# Patient Record
Sex: FEMALE | Race: BLACK OR AFRICAN AMERICAN | NOT HISPANIC OR LATINO | Employment: FULL TIME | URBAN - METROPOLITAN AREA
[De-identification: names, ages, dates, MRNs, and addresses within clinical notes are randomized per-mention and may not be internally consistent; named-entity substitution may affect disease eponyms.]

---

## 2023-02-23 PROBLEM — R87.612 PAP SMEAR ABNORMALITY OF CERVIX WITH LGSIL: Status: ACTIVE | Noted: 2023-02-23

## 2023-02-23 PROBLEM — R10.13 EPIGASTRIC DISCOMFORT: Status: ACTIVE | Noted: 2023-02-23

## 2023-02-23 PROBLEM — J30.1 HAY FEVER: Status: ACTIVE | Noted: 2023-02-23

## 2023-02-23 PROBLEM — M25.651 STIFFNESS OF RIGHT HIP JOINT: Status: ACTIVE | Noted: 2023-02-23

## 2023-02-23 PROBLEM — G47.00 INSOMNIA: Status: ACTIVE | Noted: 2023-02-23

## 2023-02-23 PROBLEM — R00.2 PALPITATIONS: Status: ACTIVE | Noted: 2023-02-23

## 2023-02-23 PROBLEM — R06.09 DOE (DYSPNEA ON EXERTION): Status: ACTIVE | Noted: 2023-02-23

## 2023-02-23 PROBLEM — S43.402A SPRAIN OF LEFT SHOULDER: Status: ACTIVE | Noted: 2023-02-23

## 2023-02-23 PROBLEM — R35.0 URINARY FREQUENCY: Status: ACTIVE | Noted: 2023-02-23

## 2023-02-23 PROBLEM — I82.401: Status: ACTIVE | Noted: 2023-02-23

## 2023-02-23 PROBLEM — B18.2 CHRONIC HEPATITIS C (MULTI): Status: ACTIVE | Noted: 2023-02-23

## 2023-02-23 PROBLEM — R42 DIZZINESS: Status: ACTIVE | Noted: 2023-02-23

## 2023-02-23 PROBLEM — I97.89: Status: ACTIVE | Noted: 2023-02-23

## 2023-02-23 PROBLEM — E78.5 HYPERLIPIDEMIA: Status: ACTIVE | Noted: 2023-02-23

## 2023-02-23 PROBLEM — I47.29 NSVT (NONSUSTAINED VENTRICULAR TACHYCARDIA) (MULTI): Status: ACTIVE | Noted: 2023-02-23

## 2023-02-23 PROBLEM — K21.9 GERD (GASTROESOPHAGEAL REFLUX DISEASE): Status: ACTIVE | Noted: 2023-02-23

## 2023-02-23 PROBLEM — M51.36 DISC DEGENERATION, LUMBAR: Status: ACTIVE | Noted: 2023-02-23

## 2023-02-23 PROBLEM — L90.5 FACIAL SCAR: Status: ACTIVE | Noted: 2023-02-23

## 2023-02-23 PROBLEM — R10.12 LEFT UPPER QUADRANT PAIN: Status: ACTIVE | Noted: 2023-02-23

## 2023-02-23 PROBLEM — M79.621 PAIN IN RIGHT AXILLA: Status: ACTIVE | Noted: 2023-02-23

## 2023-02-23 PROBLEM — M25.512 LEFT SHOULDER PAIN: Status: ACTIVE | Noted: 2023-02-23

## 2023-02-23 PROBLEM — R07.89 ATYPICAL CHEST PAIN: Status: ACTIVE | Noted: 2023-02-23

## 2023-02-23 PROBLEM — K76.0 FATTY LIVER: Status: ACTIVE | Noted: 2023-02-23

## 2023-02-23 PROBLEM — N28.1 CYST, KIDNEY, ACQUIRED: Status: ACTIVE | Noted: 2023-02-23

## 2023-02-23 PROBLEM — M54.12 CERVICAL RADICULOPATHY: Status: ACTIVE | Noted: 2023-02-23

## 2023-02-23 PROBLEM — I82.4Y9: Status: ACTIVE | Noted: 2023-02-23

## 2023-02-23 PROBLEM — I49.3 VENTRICULAR ECTOPICS: Status: ACTIVE | Noted: 2023-02-23

## 2023-02-23 PROBLEM — H92.02 ACUTE PAIN OF LEFT EAR: Status: ACTIVE | Noted: 2023-02-23

## 2023-02-23 PROBLEM — R22.30 MASS OF AXILLA: Status: ACTIVE | Noted: 2023-02-23

## 2023-02-23 PROBLEM — H69.91 DYSFUNCTION OF RIGHT EUSTACHIAN TUBE: Status: ACTIVE | Noted: 2023-02-23

## 2023-02-23 PROBLEM — M54.2 NECK PAIN: Status: ACTIVE | Noted: 2023-02-23

## 2023-02-23 PROBLEM — H00.021 HORDEOLUM INTERNUM OF RIGHT UPPER EYELID: Status: ACTIVE | Noted: 2023-02-23

## 2023-02-23 PROBLEM — N64.4 PAIN OF RIGHT BREAST: Status: ACTIVE | Noted: 2023-02-23

## 2023-02-23 PROBLEM — M17.10 PRIMARY LOCALIZED OSTEOARTHRITIS OF KNEE: Status: ACTIVE | Noted: 2023-02-23

## 2023-02-23 PROBLEM — J44.9 COPD (CHRONIC OBSTRUCTIVE PULMONARY DISEASE) (MULTI): Status: ACTIVE | Noted: 2023-02-23

## 2023-02-23 PROBLEM — M25.469 EFFUSION OF JOINT, LOWER LEG: Status: ACTIVE | Noted: 2023-02-23

## 2023-02-23 PROBLEM — R87.619 ABNORMAL PAP SMEAR OF CERVIX: Status: ACTIVE | Noted: 2023-02-23

## 2023-02-23 PROBLEM — E55.9 VITAMIN D DEFICIENCY: Status: ACTIVE | Noted: 2023-02-23

## 2023-02-23 PROBLEM — M51.369 DISC DEGENERATION, LUMBAR: Status: ACTIVE | Noted: 2023-02-23

## 2023-02-23 PROBLEM — H92.01 ACUTE PAIN OF RIGHT EAR: Status: ACTIVE | Noted: 2023-02-23

## 2023-02-23 PROBLEM — N94.9 VAGINAL DISCOMFORT: Status: ACTIVE | Noted: 2023-02-23

## 2023-02-23 PROBLEM — R11.0 NAUSEA IN ADULT: Status: ACTIVE | Noted: 2023-02-23

## 2023-02-23 PROBLEM — M25.519 SHOULDER PAIN: Status: ACTIVE | Noted: 2023-02-23

## 2023-02-23 PROBLEM — F17.210 CIGARETTE NICOTINE DEPENDENCE: Status: ACTIVE | Noted: 2023-02-23

## 2023-02-23 PROBLEM — M94.0 COSTOCHONDRITIS: Status: ACTIVE | Noted: 2023-02-23

## 2023-02-23 PROBLEM — R05.9 COUGH: Status: ACTIVE | Noted: 2023-02-23

## 2023-02-23 PROBLEM — R51.9 FREQUENT HEADACHES: Status: ACTIVE | Noted: 2023-02-23

## 2023-02-23 PROBLEM — M25.511 ACUTE PAIN OF RIGHT SHOULDER: Status: ACTIVE | Noted: 2023-02-23

## 2023-02-23 PROBLEM — M25.569 JOINT PAIN, KNEE: Status: ACTIVE | Noted: 2023-02-23

## 2023-02-23 PROBLEM — M25.551 RIGHT HIP PAIN: Status: ACTIVE | Noted: 2023-02-23

## 2023-02-23 PROBLEM — R87.610 ASCUS OF CERVIX WITH NEGATIVE HIGH RISK HPV: Status: ACTIVE | Noted: 2023-02-23

## 2023-02-23 PROBLEM — M54.50 LOWER BACK PAIN: Status: ACTIVE | Noted: 2023-02-23

## 2023-02-23 PROBLEM — M54.41 RIGHT-SIDED LOW BACK PAIN WITH SCIATICA: Status: ACTIVE | Noted: 2023-02-23

## 2023-02-23 PROBLEM — J01.90 ACUTE SINUSITIS: Status: ACTIVE | Noted: 2023-02-23

## 2023-02-23 PROBLEM — L73.2 HYDRADENITIS: Status: ACTIVE | Noted: 2023-02-23

## 2023-02-23 PROBLEM — J22 ACUTE RESPIRATORY INFECTION: Status: ACTIVE | Noted: 2023-02-23

## 2023-02-23 PROBLEM — M79.622 PAIN IN LEFT AXILLA: Status: ACTIVE | Noted: 2023-02-23

## 2023-02-23 PROBLEM — R10.31 RIGHT INGUINAL PAIN: Status: ACTIVE | Noted: 2023-02-23

## 2023-02-23 PROBLEM — M54.2 CERVICALGIA: Status: ACTIVE | Noted: 2023-02-23

## 2023-02-23 PROBLEM — M16.11 PRIMARY OSTEOARTHRITIS OF RIGHT HIP: Status: ACTIVE | Noted: 2023-02-23

## 2023-02-23 PROBLEM — B33.8 INFECTIOUS LYMPHOCYTOSIS: Status: ACTIVE | Noted: 2023-02-23

## 2023-02-23 PROBLEM — M25.561 RIGHT KNEE PAIN: Status: ACTIVE | Noted: 2023-02-23

## 2023-02-23 PROBLEM — M54.16 LUMBAR RADICULOPATHY: Status: ACTIVE | Noted: 2023-02-23

## 2023-02-23 PROBLEM — J34.89 SINUS PRESSURE: Status: ACTIVE | Noted: 2023-02-23

## 2023-02-23 PROBLEM — K59.00 CONSTIPATION: Status: ACTIVE | Noted: 2023-02-23

## 2023-02-23 PROBLEM — I47.10 PAROXYSMAL SVT (SUPRAVENTRICULAR TACHYCARDIA) (CMS-HCC): Status: ACTIVE | Noted: 2023-02-23

## 2023-02-23 PROBLEM — I49.8 FLUTTERING HEART: Status: ACTIVE | Noted: 2023-02-23

## 2023-02-23 RX ORDER — IBUPROFEN 800 MG/1
1 TABLET ORAL 3 TIMES DAILY PRN
COMMUNITY
Start: 2015-04-03 | End: 2023-04-05

## 2023-02-23 RX ORDER — ESZOPICLONE 2 MG/1
1 TABLET, FILM COATED ORAL NIGHTLY PRN
COMMUNITY
Start: 2020-10-13 | End: 2023-04-03 | Stop reason: SDUPTHER

## 2023-02-23 RX ORDER — OMEPRAZOLE 40 MG/1
1 CAPSULE, DELAYED RELEASE ORAL DAILY
COMMUNITY
Start: 2020-02-10 | End: 2023-11-16

## 2023-02-23 RX ORDER — FLUTICASONE PROPIONATE 50 MCG
1 SPRAY, SUSPENSION (ML) NASAL 2 TIMES DAILY
COMMUNITY

## 2023-02-23 RX ORDER — ALBUTEROL SULFATE 90 UG/1
1-2 AEROSOL, METERED RESPIRATORY (INHALATION)
COMMUNITY
Start: 2022-03-03 | End: 2023-04-05

## 2023-02-23 RX ORDER — ERGOCALCIFEROL 1.25 MG/1
1 CAPSULE ORAL
COMMUNITY
Start: 2020-02-05 | End: 2024-04-09

## 2023-02-23 RX ORDER — CEFDINIR 300 MG/1
300 CAPSULE ORAL EVERY 12 HOURS
COMMUNITY
End: 2023-03-17 | Stop reason: ALTCHOICE

## 2023-02-23 RX ORDER — FLECAINIDE ACETATE 50 MG/1
1 TABLET ORAL EVERY 12 HOURS
COMMUNITY
Start: 2021-12-16 | End: 2023-03-17 | Stop reason: SINTOL

## 2023-02-23 RX ORDER — ROSUVASTATIN CALCIUM 10 MG/1
1 TABLET, COATED ORAL DAILY
COMMUNITY
Start: 2022-03-04 | End: 2023-04-05

## 2023-03-17 ENCOUNTER — OFFICE VISIT (OUTPATIENT)
Dept: PRIMARY CARE | Facility: CLINIC | Age: 68
End: 2023-03-17
Payer: COMMERCIAL

## 2023-03-17 VITALS
HEIGHT: 65 IN | WEIGHT: 143 LBS | HEART RATE: 75 BPM | BODY MASS INDEX: 23.82 KG/M2 | SYSTOLIC BLOOD PRESSURE: 131 MMHG | DIASTOLIC BLOOD PRESSURE: 68 MMHG

## 2023-03-17 DIAGNOSIS — I97.89: ICD-10-CM

## 2023-03-17 DIAGNOSIS — Z01.419 ENCOUNTER FOR ROUTINE GYNECOLOGICAL EXAMINATION WITH PAPANICOLAOU SMEAR OF CERVIX: Primary | ICD-10-CM

## 2023-03-17 DIAGNOSIS — I82.401: ICD-10-CM

## 2023-03-17 DIAGNOSIS — J44.1 CHRONIC OBSTRUCTIVE PULMONARY DISEASE WITH ACUTE EXACERBATION (MULTI): ICD-10-CM

## 2023-03-17 DIAGNOSIS — B18.2 CHRONIC HEPATITIS C WITHOUT HEPATIC COMA (MULTI): ICD-10-CM

## 2023-03-17 DIAGNOSIS — I47.10 PAROXYSMAL SVT (SUPRAVENTRICULAR TACHYCARDIA) (CMS-HCC): ICD-10-CM

## 2023-03-17 DIAGNOSIS — R10.31 RIGHT INGUINAL PAIN: ICD-10-CM

## 2023-03-17 DIAGNOSIS — I10 BENIGN HYPERTENSION: ICD-10-CM

## 2023-03-17 DIAGNOSIS — Z00.00 ROUTINE PHYSICAL EXAMINATION: ICD-10-CM

## 2023-03-17 DIAGNOSIS — E55.9 VITAMIN D DEFICIENCY: ICD-10-CM

## 2023-03-17 PROCEDURE — 82652 VIT D 1 25-DIHYDROXY: CPT

## 2023-03-17 PROCEDURE — 3075F SYST BP GE 130 - 139MM HG: CPT | Performed by: NURSE PRACTITIONER

## 2023-03-17 PROCEDURE — 87591 N.GONORRHOEAE DNA AMP PROB: CPT

## 2023-03-17 PROCEDURE — 36415 COLL VENOUS BLD VENIPUNCTURE: CPT | Performed by: NURSE PRACTITIONER

## 2023-03-17 PROCEDURE — 4004F PT TOBACCO SCREEN RCVD TLK: CPT | Performed by: NURSE PRACTITIONER

## 2023-03-17 PROCEDURE — 80053 COMPREHEN METABOLIC PANEL: CPT

## 2023-03-17 PROCEDURE — 87491 CHLMYD TRACH DNA AMP PROBE: CPT

## 2023-03-17 PROCEDURE — 99397 PER PM REEVAL EST PAT 65+ YR: CPT | Performed by: NURSE PRACTITIONER

## 2023-03-17 PROCEDURE — 1159F MED LIST DOCD IN RCRD: CPT | Performed by: NURSE PRACTITIONER

## 2023-03-17 PROCEDURE — 85025 COMPLETE CBC W/AUTO DIFF WBC: CPT

## 2023-03-17 PROCEDURE — 80061 LIPID PANEL: CPT

## 2023-03-17 PROCEDURE — 88175 CYTOPATH C/V AUTO FLUID REDO: CPT

## 2023-03-17 PROCEDURE — 87624 HPV HI-RISK TYP POOLED RSLT: CPT

## 2023-03-17 PROCEDURE — 87205 SMEAR GRAM STAIN: CPT

## 2023-03-17 PROCEDURE — 1126F AMNT PAIN NOTED NONE PRSNT: CPT | Performed by: NURSE PRACTITIONER

## 2023-03-17 PROCEDURE — 3078F DIAST BP <80 MM HG: CPT | Performed by: NURSE PRACTITIONER

## 2023-03-17 PROCEDURE — 87661 TRICHOMONAS VAGINALIS AMPLIF: CPT

## 2023-03-17 PROCEDURE — 83036 HEMOGLOBIN GLYCOSYLATED A1C: CPT

## 2023-03-17 ASSESSMENT — ENCOUNTER SYMPTOMS
OCCASIONAL FEELINGS OF UNSTEADINESS: 0
DEPRESSION: 0
LOSS OF SENSATION IN FEET: 0

## 2023-03-17 NOTE — PROGRESS NOTES
Reason for Visit: Annual Physical Exam    HPI: fell about 3 weeks ago, slid down staircase, on buttocks. Reported bruising on buttocks. Continues to have right hip and groin pain. Was recently taken off of anticoagulation; per Dr. Castano. Had completed 6 months of anticoagulation for DVT.     She is not on Medicare, part B. Has commercial insurance.      Active Problem List  Patient Active Problem List   Diagnosis    Abnormal Pap smear of cervix    Acute deep vein thrombosis (DVT) of right lower extremity after procedure (CMS/HCC)    Acute pain of left ear    Acute pain of right ear    Acute pain of right shoulder    Acute respiratory infection    Acute sinusitis    ASCUS of cervix with negative high risk HPV    Atypical chest pain    Cervical radiculopathy    Cervicalgia    Chronic hepatitis C (CMS/HCC)    Cigarette nicotine dependence    Constipation    COPD (chronic obstructive pulmonary disease) (CMS/HCC)    Costochondritis    Cough    Cyst, kidney, acquired    Disc degeneration, lumbar    Dizziness    EVANS (dyspnea on exertion)    Dysfunction of right eustachian tube    Effusion of joint, lower leg    Epigastric discomfort    Facial scar    Fatty liver    Fluttering heart    Frequent headaches    GERD (gastroesophageal reflux disease)    Hay fever    Palpitations    Hordeolum internum of right upper eyelid    Hydradenitis    Hyperlipidemia    Infectious lymphocytosis    Insomnia    Joint pain, knee    Left shoulder pain    Left upper quadrant pain    Lower back pain    Lumbar radiculopathy    Mass of axilla    Nausea in adult    Neck pain    NSVT (nonsustained ventricular tachycardia)    Pain in left axilla    Pain in right axilla    Pain of right breast    Pap smear abnormality of cervix with LGSIL    Paroxysmal SVT (supraventricular tachycardia) (CMS/HCC)    Primary localized osteoarthritis of knee    Primary osteoarthritis of right hip    Right hip pain    Right inguinal pain    Right knee pain     Right-sided low back pain with sciatica    Shoulder pain    Sinus pressure    Sprain of left shoulder    Stiffness of right hip joint    Thigh DVT (deep venous thrombosis) (CMS/HCC)    Urinary frequency    Vaginal discomfort    Ventricular ectopics    Vitamin D deficiency       Comprehensive Medical/Surgical/Social/Family History  Past Medical History:   Diagnosis Date    Personal history of other infectious and parasitic diseases     History of hepatitis C     Past Surgical History:   Procedure Laterality Date    COLONOSCOPY  06/03/2013    Complete Colonoscopy    COLONOSCOPY  02/13/2014    Complete Colonoscopy    CT HEART CORONARY ANGIOGRAM  7/9/2021    CT HEART CORONARY ANGIOGRAM 7/9/2021 [facility] LEGAC[facility]    ESOPHAGOGASTRODUODENOSCOPY  02/27/2014    Diagnostic Esophagogastroduodenoscopy    LAPAROSCOPY DIAGNOSTIC / BIOPSY / ASPIRATION / LYSIS  02/13/2014    Exploratory Laparoscopy    OTHER SURGICAL HISTORY  06/05/2013    Chest Tube Insertion     Social History     Social History Narrative    Not on file         Allergies and Medications  Patient has no allergy information on record.  Current Outpatient Medications on File Prior to Visit   Medication Sig Dispense Refill    albuterol 90 mcg/actuation inhaler Inhale 1-2 puffs. EVERY 4 TO 6 HOURS AS NEEDED.      eszopiclone (Lunesta) 2 mg tablet Take 1 tablet (2 mg) by mouth as needed at bedtime for sleep.      fluticasone (Flonase) 50 mcg/actuation nasal spray Administer 1 spray into each nostril in the morning and 1 spray before bedtime. Shake gently. Before first use, prime pump. After use, clean tip and replace cap..      fluticasone-umeclidin-vilanter (TRELEGY-ELLIPTA) 100-62.5-25 mcg blister with device Inhale 1 puff once daily.      omeprazole (PriLOSEC) 40 mg DR capsule Take 1 capsule (40 mg) by mouth once daily.      rosuvastatin (Crestor) 10 mg tablet Take 1 tablet (10 mg) by mouth once daily.      ergocalciferol (Vitamin D-2) 1.25 MG (87614 UT) capsule  "Take 1 capsule (1,250 mcg) by mouth 1 (one) time per week.      flecainide (Tambocor) 50 mg tablet Take 1 tablet (50 mg) by mouth in the morning and 1 tablet (50 mg) in the evening.      ibuprofen 800 mg tablet Take 1 tablet (800 mg) by mouth 3 times a day as needed (take with food).      mv-mn/folic ac/calcium/vit K1 (WOMEN'S 50 PLUS MULTIVITAMIN ORAL) Take 1 tablet by mouth once daily.      [DISCONTINUED] cefdinir (Omnicef) 300 mg capsule Take 1 capsule (300 mg) by mouth in the morning and 1 capsule (300 mg) in the evening.      [DISCONTINUED] rivaroxaban (Xarelto) 20 mg tablet Take 1 tablet (20 mg) by mouth once daily.       No current facility-administered medications on file prior to visit.         ROS otherwise negative aside from what was mentioned above in HPI.    Vitals  /68   Pulse 75   Ht 1.651 m (5' 5\")   Wt 64.9 kg (143 lb)   BMI 23.80 kg/m²   Body mass index is 23.8 kg/m².  Physical Exam  Gen: Alert, NAD  HEENT:  PERRLA, EOMI, conjunctiva and sclera normal in appearance. External auditory canals/TMs normal; Oral cavity and posterior pharynx without lesions/exudate  Neck:  Supple with FROM; No masses/nodes palpable; Thyroid nontender and without nodules; No JG  Respiratory:  Lungs CTAB  Cardiovascular:  Heart RRR. No M/R/G. Peripheral pulses equal bilaterally  Abdomen:  Soft, nontender, BS present throughout; No R/G/R; No HSM or masses palpated  Extremities:  FROM all extremities; Muscle strength grossly normal with good tone  Neuro:  CN II-XII intact; Reflexes 2+/2+; Gross motor and sensory intact  Skin:  No suspicious lesions present  Breast: No masses, skin lesions or nipple discharges, no axillary lymphadenopathy    Assessment and Plan:  Problem List Items Addressed This Visit          Nervous    Right inguinal pain       Respiratory    COPD (chronic obstructive pulmonary disease) (CMS/HCC)       Circulatory    Acute deep vein thrombosis (DVT) of right lower extremity after procedure " (CMS/HCC)    Current Assessment & Plan     Off anticoagulation.          Relevant Orders    Vascular US upper extremity venous duplex right    Paroxysmal SVT (supraventricular tachycardia) (CMS/HCC)       Digestive    Chronic hepatitis C (CMS/HCC)       Endocrine/Metabolic    Vitamin D deficiency    Relevant Orders    Vitamin D 1,25 Dihydroxy     Other Visit Diagnoses       Encounter for routine gynecological examination with Papanicolaou smear of cervix    -  Primary    Relevant Orders    THINPREP PAP TEST    C. trachomatis / N. gonorrhoeae, DNA probe    TRICH VAGINALIS, AMPLIFIED    Gram Stain for Bacterial Vaginosis/Yeast    Benign hypertension        Relevant Orders    CBC and Auto Differential    Lipid Panel    Comprehensive Metabolic Panel    Routine physical examination        Relevant Orders    Hemoglobin A1C        Patient was identified as a fall risk. Risk prevention instructions provided. Consider PT if fall risk persists or gets worse.

## 2023-03-17 NOTE — PATIENT INSTRUCTIONS

## 2023-03-18 LAB
ALANINE AMINOTRANSFERASE (SGPT) (U/L) IN SER/PLAS: 7 U/L (ref 7–45)
ALBUMIN (G/DL) IN SER/PLAS: 4.1 G/DL (ref 3.4–5)
ALKALINE PHOSPHATASE (U/L) IN SER/PLAS: 83 U/L (ref 33–136)
ANION GAP IN SER/PLAS: 11 MMOL/L (ref 10–20)
ASPARTATE AMINOTRANSFERASE (SGOT) (U/L) IN SER/PLAS: 12 U/L (ref 9–39)
BASOPHILS (10*3/UL) IN BLOOD BY AUTOMATED COUNT: 0.01 X10E9/L (ref 0–0.1)
BASOPHILS/100 LEUKOCYTES IN BLOOD BY AUTOMATED COUNT: 0.2 % (ref 0–2)
BILIRUBIN TOTAL (MG/DL) IN SER/PLAS: 0.5 MG/DL (ref 0–1.2)
CALCIUM (MG/DL) IN SER/PLAS: 9.6 MG/DL (ref 8.6–10.6)
CARBON DIOXIDE, TOTAL (MMOL/L) IN SER/PLAS: 24 MMOL/L (ref 21–32)
CHLAMYDIA TRACH., AMPLIFIED: NEGATIVE
CHLORIDE (MMOL/L) IN SER/PLAS: 110 MMOL/L (ref 98–107)
CHOLESTEROL (MG/DL) IN SER/PLAS: 171 MG/DL (ref 0–199)
CHOLESTEROL IN HDL (MG/DL) IN SER/PLAS: 58.8 MG/DL
CHOLESTEROL/HDL RATIO: 2.9
CLUE CELLS: NORMAL
CREATININE (MG/DL) IN SER/PLAS: 0.69 MG/DL (ref 0.5–1.05)
EOSINOPHILS (10*3/UL) IN BLOOD BY AUTOMATED COUNT: 0.06 X10E9/L (ref 0–0.7)
EOSINOPHILS/100 LEUKOCYTES IN BLOOD BY AUTOMATED COUNT: 0.9 % (ref 0–6)
ERYTHROCYTE DISTRIBUTION WIDTH (RATIO) BY AUTOMATED COUNT: 14.7 % (ref 11.5–14.5)
ERYTHROCYTE MEAN CORPUSCULAR HEMOGLOBIN CONCENTRATION (G/DL) BY AUTOMATED: 31.7 G/DL (ref 32–36)
ERYTHROCYTE MEAN CORPUSCULAR VOLUME (FL) BY AUTOMATED COUNT: 96 FL (ref 80–100)
ERYTHROCYTES (10*6/UL) IN BLOOD BY AUTOMATED COUNT: 4.28 X10E12/L (ref 4–5.2)
ESTIMATED AVERAGE GLUCOSE FOR HBA1C: 123 MG/DL
GFR FEMALE: >90 ML/MIN/1.73M2
GLUCOSE (MG/DL) IN SER/PLAS: 86 MG/DL (ref 74–99)
HEMATOCRIT (%) IN BLOOD BY AUTOMATED COUNT: 41 % (ref 36–46)
HEMOGLOBIN (G/DL) IN BLOOD: 13 G/DL (ref 12–16)
HEMOGLOBIN A1C/HEMOGLOBIN TOTAL IN BLOOD: 5.9 %
IMMATURE GRANULOCYTES/100 LEUKOCYTES IN BLOOD BY AUTOMATED COUNT: 0.2 % (ref 0–0.9)
LDL: 94 MG/DL (ref 0–99)
LEUKOCYTES (10*3/UL) IN BLOOD BY AUTOMATED COUNT: 6.4 X10E9/L (ref 4.4–11.3)
LYMPHOCYTES (10*3/UL) IN BLOOD BY AUTOMATED COUNT: 1.69 X10E9/L (ref 1.2–4.8)
LYMPHOCYTES/100 LEUKOCYTES IN BLOOD BY AUTOMATED COUNT: 26.5 % (ref 13–44)
MONOCYTES (10*3/UL) IN BLOOD BY AUTOMATED COUNT: 0.42 X10E9/L (ref 0.1–1)
MONOCYTES/100 LEUKOCYTES IN BLOOD BY AUTOMATED COUNT: 6.6 % (ref 2–10)
N. GONORRHEA, AMPLIFIED: NEGATIVE
NEUTROPHILS (10*3/UL) IN BLOOD BY AUTOMATED COUNT: 4.18 X10E9/L (ref 1.2–7.7)
NEUTROPHILS/100 LEUKOCYTES IN BLOOD BY AUTOMATED COUNT: 65.6 % (ref 40–80)
NRBC (PER 100 WBCS) BY AUTOMATED COUNT: 0 /100 WBC (ref 0–0)
NUGENT SCORE: 2
PLATELETS (10*3/UL) IN BLOOD AUTOMATED COUNT: 238 X10E9/L (ref 150–450)
POTASSIUM (MMOL/L) IN SER/PLAS: 4.1 MMOL/L (ref 3.5–5.3)
PROTEIN TOTAL: 6.4 G/DL (ref 6.4–8.2)
SODIUM (MMOL/L) IN SER/PLAS: 141 MMOL/L (ref 136–145)
TRICHOMONAS VAGINALIS: NEGATIVE
TRIGLYCERIDE (MG/DL) IN SER/PLAS: 93 MG/DL (ref 0–149)
UREA NITROGEN (MG/DL) IN SER/PLAS: 13 MG/DL (ref 6–23)
VLDL: 19 MG/DL (ref 0–40)
YEAST: NORMAL

## 2023-03-20 ENCOUNTER — TELEPHONE (OUTPATIENT)
Dept: PRIMARY CARE | Facility: CLINIC | Age: 68
End: 2023-03-20
Payer: COMMERCIAL

## 2023-03-20 LAB — VITAMIN D 1,25-DIHYDROXY: 45.4 PG/ML (ref 19.9–79.3)

## 2023-03-23 ENCOUNTER — TELEPHONE (OUTPATIENT)
Dept: PRIMARY CARE | Facility: CLINIC | Age: 68
End: 2023-03-23
Payer: COMMERCIAL

## 2023-03-23 LAB
COMPLETE PATHOLOGY REPORT: NORMAL
CONVERTED CLINICAL DIAGNOSIS-HISTORY: NORMAL
CONVERTED DIAGNOSIS COMMENT: NORMAL
CONVERTED FINAL DIAGNOSIS: NORMAL
CONVERTED FINAL REPORT PDF LINK TO COPY AND PASTE: NORMAL

## 2023-03-23 NOTE — TELEPHONE ENCOUNTER
----- Message from KEYANNA Roman sent at 3/23/2023  1:26 PM EDT -----  Venous doppler negative for DVT in bilateral lower extremities.  ----- Message -----  From: Deepa Syngo - Cardiology Results In  Sent: 3/23/2023   8:19 AM EDT  To: KEYANNA Roman

## 2023-03-24 ENCOUNTER — TELEPHONE (OUTPATIENT)
Dept: PRIMARY CARE | Facility: CLINIC | Age: 68
End: 2023-03-24
Payer: COMMERCIAL

## 2023-04-03 DIAGNOSIS — G47.00 INSOMNIA, UNSPECIFIED TYPE: ICD-10-CM

## 2023-04-03 RX ORDER — ESZOPICLONE 2 MG/1
2 TABLET, FILM COATED ORAL NIGHTLY PRN
Qty: 90 TABLET | Refills: 0 | Status: SHIPPED | OUTPATIENT
Start: 2023-04-03 | End: 2023-06-13 | Stop reason: SDUPTHER

## 2023-04-04 DIAGNOSIS — M54.12 CERVICAL RADICULOPATHY: ICD-10-CM

## 2023-04-04 DIAGNOSIS — Z00.00 ENCOUNTER FOR GENERAL ADULT MEDICAL EXAMINATION WITHOUT ABNORMAL FINDINGS: ICD-10-CM

## 2023-04-04 DIAGNOSIS — K76.0 FATTY (CHANGE OF) LIVER, NOT ELSEWHERE CLASSIFIED: ICD-10-CM

## 2023-04-05 RX ORDER — ALBUTEROL SULFATE 90 UG/1
2 AEROSOL, METERED RESPIRATORY (INHALATION)
Qty: 28 G | Refills: 1 | Status: SHIPPED | OUTPATIENT
Start: 2023-04-05 | End: 2023-06-12

## 2023-04-05 RX ORDER — ROSUVASTATIN CALCIUM 10 MG/1
10 TABLET, COATED ORAL DAILY
Qty: 90 TABLET | Refills: 1 | Status: SHIPPED | OUTPATIENT
Start: 2023-04-05 | End: 2023-09-18 | Stop reason: SDUPTHER

## 2023-04-05 RX ORDER — IBUPROFEN 800 MG/1
800 TABLET ORAL 3 TIMES DAILY
Qty: 90 TABLET | Refills: 1 | Status: SHIPPED | OUTPATIENT
Start: 2023-04-05 | End: 2023-07-10

## 2023-04-26 DIAGNOSIS — T75.3XXD MOTION SICKNESS, SUBSEQUENT ENCOUNTER: Primary | ICD-10-CM

## 2023-04-26 RX ORDER — SCOLOPAMINE TRANSDERMAL SYSTEM 1 MG/1
1 PATCH, EXTENDED RELEASE TRANSDERMAL
Qty: 5 PATCH | Refills: 0 | Status: SHIPPED | OUTPATIENT
Start: 2023-04-26 | End: 2023-05-01

## 2023-06-11 DIAGNOSIS — Z00.00 ENCOUNTER FOR GENERAL ADULT MEDICAL EXAMINATION WITHOUT ABNORMAL FINDINGS: ICD-10-CM

## 2023-06-12 RX ORDER — ALBUTEROL SULFATE 90 UG/1
2 AEROSOL, METERED RESPIRATORY (INHALATION)
Qty: 54 G | Refills: 1 | Status: SHIPPED | OUTPATIENT
Start: 2023-06-12 | End: 2023-09-24

## 2023-06-13 DIAGNOSIS — G47.00 INSOMNIA, UNSPECIFIED TYPE: ICD-10-CM

## 2023-06-13 RX ORDER — ESZOPICLONE 2 MG/1
2 TABLET, FILM COATED ORAL NIGHTLY PRN
Qty: 90 TABLET | Refills: 0 | Status: SHIPPED | OUTPATIENT
Start: 2023-06-13 | End: 2023-09-18 | Stop reason: SDUPTHER

## 2023-07-10 DIAGNOSIS — M54.12 CERVICAL RADICULOPATHY: ICD-10-CM

## 2023-07-10 RX ORDER — IBUPROFEN 800 MG/1
800 TABLET ORAL 3 TIMES DAILY
Qty: 90 TABLET | Refills: 1 | Status: SHIPPED | OUTPATIENT
Start: 2023-07-10 | End: 2023-09-05

## 2023-09-04 DIAGNOSIS — M54.12 CERVICAL RADICULOPATHY: ICD-10-CM

## 2023-09-05 RX ORDER — IBUPROFEN 800 MG/1
800 TABLET ORAL 3 TIMES DAILY
Qty: 90 TABLET | Refills: 1 | Status: SHIPPED | OUTPATIENT
Start: 2023-09-05 | End: 2023-12-12

## 2023-09-18 ENCOUNTER — OFFICE VISIT (OUTPATIENT)
Dept: PRIMARY CARE | Facility: CLINIC | Age: 68
End: 2023-09-18
Payer: COMMERCIAL

## 2023-09-18 VITALS
BODY MASS INDEX: 23.66 KG/M2 | HEIGHT: 65 IN | HEART RATE: 80 BPM | WEIGHT: 142 LBS | DIASTOLIC BLOOD PRESSURE: 70 MMHG | SYSTOLIC BLOOD PRESSURE: 115 MMHG

## 2023-09-18 DIAGNOSIS — K76.0 FATTY (CHANGE OF) LIVER, NOT ELSEWHERE CLASSIFIED: ICD-10-CM

## 2023-09-18 DIAGNOSIS — E78.00 PURE HYPERCHOLESTEROLEMIA: Primary | ICD-10-CM

## 2023-09-18 DIAGNOSIS — H66.91 RIGHT OTITIS MEDIA, UNSPECIFIED OTITIS MEDIA TYPE: ICD-10-CM

## 2023-09-18 DIAGNOSIS — R73.03 PREDIABETES: ICD-10-CM

## 2023-09-18 DIAGNOSIS — G47.00 INSOMNIA, UNSPECIFIED TYPE: ICD-10-CM

## 2023-09-18 DIAGNOSIS — Z12.31 BREAST CANCER SCREENING BY MAMMOGRAM: ICD-10-CM

## 2023-09-18 LAB
ESTIMATED AVERAGE GLUCOSE FOR HBA1C: 114 MG/DL
HEMOGLOBIN A1C/HEMOGLOBIN TOTAL IN BLOOD: 5.6 %

## 2023-09-18 PROCEDURE — 4004F PT TOBACCO SCREEN RCVD TLK: CPT | Performed by: NURSE PRACTITIONER

## 2023-09-18 PROCEDURE — 99214 OFFICE O/P EST MOD 30 MIN: CPT | Performed by: NURSE PRACTITIONER

## 2023-09-18 PROCEDURE — 80358 DRUG SCREENING METHADONE: CPT

## 2023-09-18 PROCEDURE — 80354 DRUG SCREENING FENTANYL: CPT

## 2023-09-18 PROCEDURE — 80368 SEDATIVE HYPNOTICS: CPT

## 2023-09-18 PROCEDURE — 80061 LIPID PANEL: CPT

## 2023-09-18 PROCEDURE — 1160F RVW MEDS BY RX/DR IN RCRD: CPT | Performed by: NURSE PRACTITIONER

## 2023-09-18 PROCEDURE — 82550 ASSAY OF CK (CPK): CPT

## 2023-09-18 PROCEDURE — 83036 HEMOGLOBIN GLYCOSYLATED A1C: CPT

## 2023-09-18 PROCEDURE — 80307 DRUG TEST PRSMV CHEM ANLYZR: CPT

## 2023-09-18 PROCEDURE — 80346 BENZODIAZEPINES1-12: CPT

## 2023-09-18 PROCEDURE — 1126F AMNT PAIN NOTED NONE PRSNT: CPT | Performed by: NURSE PRACTITIONER

## 2023-09-18 PROCEDURE — 80361 OPIATES 1 OR MORE: CPT

## 2023-09-18 PROCEDURE — 80053 COMPREHEN METABOLIC PANEL: CPT

## 2023-09-18 PROCEDURE — 80365 DRUG SCREENING OXYCODONE: CPT

## 2023-09-18 PROCEDURE — 80373 DRUG SCREENING TRAMADOL: CPT

## 2023-09-18 PROCEDURE — 1159F MED LIST DOCD IN RCRD: CPT | Performed by: NURSE PRACTITIONER

## 2023-09-18 RX ORDER — ROSUVASTATIN CALCIUM 10 MG/1
10 TABLET, COATED ORAL DAILY
Qty: 90 TABLET | Refills: 1 | Status: SHIPPED | OUTPATIENT
Start: 2023-09-18 | End: 2024-04-16 | Stop reason: SDUPTHER

## 2023-09-18 RX ORDER — ESZOPICLONE 2 MG/1
2 TABLET, FILM COATED ORAL NIGHTLY PRN
Qty: 90 TABLET | Refills: 0 | Status: SHIPPED | OUTPATIENT
Start: 2023-09-18 | End: 2024-04-16 | Stop reason: SDUPTHER

## 2023-09-18 RX ORDER — CEFDINIR 300 MG/1
300 CAPSULE ORAL 2 TIMES DAILY
Qty: 14 CAPSULE | Refills: 0 | Status: SHIPPED | OUTPATIENT
Start: 2023-09-18 | End: 2023-09-25

## 2023-09-18 ASSESSMENT — ENCOUNTER SYMPTOMS
DEPRESSION: 0
LOSS OF SENSATION IN FEET: 0
OCCASIONAL FEELINGS OF UNSTEADINESS: 0

## 2023-09-18 NOTE — PROGRESS NOTES
"Subjective   Patient ID: Viola Berman is a 67 y.o. female who presents for Follow-up (6 month follow up visit ).    Viola is a 67 year old female who presents to the office today for a follow-up on medications. Stated she has been having right ear pain. Denied hearing loss. Has had some nasal congestion.     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    Thought she had Covid last week; 2 home tests negative and PCR negative.     Having right ear pain. Has been taking Tylenol Arthritis.     Having muscle cramping. Reported she has been taking Rosuvastatin daily.     Having PRP on Nov 14 for right hip pain.        Review of Systems   All other systems reviewed and are negative.      Objective   /70   Pulse 80   Ht 1.651 m (5' 5\")   Wt 64.4 kg (142 lb)   BMI 23.63 kg/m²     Physical Exam  Constitutional:       Appearance: Normal appearance.   HENT:      Head: Normocephalic and atraumatic.      Left Ear: Tympanic membrane normal.      Ears:      Comments: Right TM with hazy cone of light. Mild erythema in right ear canal.     Nose: Nose normal.      Mouth/Throat:      Mouth: Mucous membranes are moist.      Pharynx: Oropharynx is clear.   Eyes:      Extraocular Movements: Extraocular movements intact.      Conjunctiva/sclera: Conjunctivae normal.      Pupils: Pupils are equal, round, and reactive to light.   Cardiovascular:      Rate and Rhythm: Normal rate and regular rhythm.      Pulses: Normal pulses.      Heart sounds: Normal heart sounds.   Pulmonary:      Effort: Pulmonary effort is normal.      Breath sounds: Normal breath sounds.   Abdominal:      General: Abdomen is flat. Bowel sounds are normal.      Palpations: Abdomen is soft.   Musculoskeletal:         General: Normal range of motion.      Cervical back: Normal range of motion and neck supple.   Skin:     General: Skin is warm and dry.      Capillary Refill: Capillary refill takes less " than 2 seconds.   Neurological:      General: No focal deficit present.      Mental Status: She is alert and oriented to person, place, and time. Mental status is at baseline.   Psychiatric:         Mood and Affect: Mood normal.         Behavior: Behavior normal.         Thought Content: Thought content normal.         Judgment: Judgment normal.         Assessment/Plan   Problem List Items Addressed This Visit       Hyperlipidemia - Primary    Relevant Orders    Creatine Kinase    Comprehensive Metabolic Panel    Lipid Panel    Insomnia    Relevant Orders    Opiate/Opioid/Benzo Extended Prescription Compliance     Other Visit Diagnoses       Fatty (change of) liver, not elsewhere classified        Relevant Medications    rosuvastatin (Crestor) 10 mg tablet    Right otitis media, unspecified otitis media type        Relevant Medications    cefdinir (Omnicef) 300 mg capsule    Prediabetes        Relevant Orders    Hemoglobin A1C (Completed)    Breast cancer screening by mammogram        Relevant Orders    BI mammo bilateral screening tomosynthesis          1) Right otitis media/sinusitis: you were prescribed Cefdinir. May continue Tylenol for ear pain. Follow-up if no improvement or if symptoms worsen.    2) Insomnia: urine drug screen collected. Instructed to avoid caffeine at bedtime along with computer work or watching TV in bed. Avoid large meals before bed. Keep sleep schedule. Warm shower or massage before bedtime may help with sleep along with reading, soft music, breathing exercises or yoga. Recommend you get out of bed if not sleeping. Eszopiclone refilled.     3) Prediabetes: A1c ordered. Counseled on watching daily carbohydrates (portion control) such as breads, pasta, rice, starchy vegetables and sweets.

## 2023-09-19 DIAGNOSIS — G47.00 INSOMNIA, UNSPECIFIED TYPE: ICD-10-CM

## 2023-09-19 LAB
ALANINE AMINOTRANSFERASE (SGPT) (U/L) IN SER/PLAS: 10 U/L (ref 7–45)
ALBUMIN (G/DL) IN SER/PLAS: 4.3 G/DL (ref 3.4–5)
ALKALINE PHOSPHATASE (U/L) IN SER/PLAS: 83 U/L (ref 33–136)
ANION GAP IN SER/PLAS: 15 MMOL/L (ref 10–20)
ASPARTATE AMINOTRANSFERASE (SGOT) (U/L) IN SER/PLAS: 13 U/L (ref 9–39)
BILIRUBIN TOTAL (MG/DL) IN SER/PLAS: 0.3 MG/DL (ref 0–1.2)
CALCIUM (MG/DL) IN SER/PLAS: 9.4 MG/DL (ref 8.6–10.6)
CARBON DIOXIDE, TOTAL (MMOL/L) IN SER/PLAS: 19 MMOL/L (ref 21–32)
CHLORIDE (MMOL/L) IN SER/PLAS: 111 MMOL/L (ref 98–107)
CHOLESTEROL (MG/DL) IN SER/PLAS: 184 MG/DL (ref 0–199)
CHOLESTEROL IN HDL (MG/DL) IN SER/PLAS: 62.8 MG/DL
CHOLESTEROL/HDL RATIO: 2.9
CREATINE KINASE (U/L) IN SER/PLAS: 65 U/L (ref 0–215)
CREATININE (MG/DL) IN SER/PLAS: 0.86 MG/DL (ref 0.5–1.05)
GFR FEMALE: 74 ML/MIN/1.73M2
GLUCOSE (MG/DL) IN SER/PLAS: 96 MG/DL (ref 74–99)
LDL: 100 MG/DL (ref 0–99)
POTASSIUM (MMOL/L) IN SER/PLAS: 4.3 MMOL/L (ref 3.5–5.3)
PROTEIN TOTAL: 6.9 G/DL (ref 6.4–8.2)
SODIUM (MMOL/L) IN SER/PLAS: 141 MMOL/L (ref 136–145)
TRIGLYCERIDE (MG/DL) IN SER/PLAS: 107 MG/DL (ref 0–149)
UREA NITROGEN (MG/DL) IN SER/PLAS: 18 MG/DL (ref 6–23)
VLDL: 21 MG/DL (ref 0–40)

## 2023-09-19 RX ORDER — ESZOPICLONE 2 MG/1
2 TABLET, FILM COATED ORAL NIGHTLY PRN
Qty: 90 TABLET | Refills: 0 | OUTPATIENT
Start: 2023-09-19

## 2023-09-22 LAB
6-ACETYLMORPHINE: <25 NG/ML
7-AMINOCLONAZEPAM: <25 NG/ML
ALPHA-HYDROXYALPRAZOLAM: <25 NG/ML
ALPHA-HYDROXYMIDAZOLAM: <25 NG/ML
ALPRAZOLAM: <25 NG/ML
AMPHETAMINE (PRESENCE) IN URINE BY SCREEN METHOD: NORMAL
BARBITURATES PRESENCE IN URINE BY SCREEN METHOD: NORMAL
CANNABINOIDS IN URINE BY SCREEN METHOD: NORMAL
CHLORDIAZEPOXIDE: <25 NG/ML
CLONAZEPAM: <25 NG/ML
COCAINE (PRESENCE) IN URINE BY SCREEN METHOD: NORMAL
CODEINE: <50 NG/ML
CREATINE, URINE FOR DRUG: 167.6 MG/DL
DIAZEPAM: <25 NG/ML
DRUG SCREEN COMMENT URINE: NORMAL
EDDP: <25 NG/ML
FENTANYL CONFIRMATION, URINE: <2.5 NG/ML
HYDROCODONE: <25 NG/ML
HYDROMORPHONE: <25 NG/ML
LORAZEPAM: <25 NG/ML
METHADONE CONFIRMATION,URINE: <25 NG/ML
MIDAZOLAM: <25 NG/ML
MORPHINE URINE: <50 NG/ML
NORDIAZEPAM: <25 NG/ML
NORFENTANYL: <2.5 NG/ML
NORHYDROCODONE: <25 NG/ML
NOROXYCODONE: <25 NG/ML
O-DESMETHYLTRAMADOL: <50 NG/ML
OXAZEPAM: <25 NG/ML
OXYCODONE: <25 NG/ML
OXYMORPHONE: <25 NG/ML
PHENCYCLIDINE (PRESENCE) IN URINE BY SCREEN METHOD: NORMAL
TEMAZEPAM: <25 NG/ML
TRAMADOL: <50 NG/ML
ZOLPIDEM METABOLITE (ZCA): <25 NG/ML
ZOLPIDEM: <25 NG/ML

## 2023-09-23 DIAGNOSIS — Z00.00 ENCOUNTER FOR GENERAL ADULT MEDICAL EXAMINATION WITHOUT ABNORMAL FINDINGS: ICD-10-CM

## 2023-09-24 RX ORDER — ALBUTEROL SULFATE 90 UG/1
2 AEROSOL, METERED RESPIRATORY (INHALATION) EVERY 4 HOURS PRN
Qty: 18 G | Refills: 3 | Status: SHIPPED | OUTPATIENT
Start: 2023-09-24

## 2023-11-07 ENCOUNTER — OFFICE VISIT (OUTPATIENT)
Dept: PRIMARY CARE | Facility: CLINIC | Age: 68
End: 2023-11-07
Payer: COMMERCIAL

## 2023-11-07 VITALS
HEART RATE: 79 BPM | HEIGHT: 65 IN | DIASTOLIC BLOOD PRESSURE: 64 MMHG | BODY MASS INDEX: 23.66 KG/M2 | SYSTOLIC BLOOD PRESSURE: 130 MMHG | WEIGHT: 142 LBS

## 2023-11-07 DIAGNOSIS — J01.01 ACUTE RECURRENT MAXILLARY SINUSITIS: Primary | ICD-10-CM

## 2023-11-07 PROCEDURE — 99214 OFFICE O/P EST MOD 30 MIN: CPT | Performed by: FAMILY MEDICINE

## 2023-11-07 PROCEDURE — 1126F AMNT PAIN NOTED NONE PRSNT: CPT | Performed by: FAMILY MEDICINE

## 2023-11-07 PROCEDURE — 4004F PT TOBACCO SCREEN RCVD TLK: CPT | Performed by: FAMILY MEDICINE

## 2023-11-07 PROCEDURE — 1160F RVW MEDS BY RX/DR IN RCRD: CPT | Performed by: FAMILY MEDICINE

## 2023-11-07 PROCEDURE — 1159F MED LIST DOCD IN RCRD: CPT | Performed by: FAMILY MEDICINE

## 2023-11-07 RX ORDER — AMOXICILLIN AND CLAVULANATE POTASSIUM 875; 125 MG/1; MG/1
875 TABLET, FILM COATED ORAL 2 TIMES DAILY
Qty: 14 TABLET | Refills: 0 | Status: SHIPPED | OUTPATIENT
Start: 2023-11-07 | End: 2023-11-14

## 2023-11-07 ASSESSMENT — PATIENT HEALTH QUESTIONNAIRE - PHQ9
1. LITTLE INTEREST OR PLEASURE IN DOING THINGS: NOT AT ALL
SUM OF ALL RESPONSES TO PHQ9 QUESTIONS 1 AND 2: 0
2. FEELING DOWN, DEPRESSED OR HOPELESS: NOT AT ALL

## 2023-11-07 ASSESSMENT — ENCOUNTER SYMPTOMS
LOSS OF SENSATION IN FEET: 0
DEPRESSION: 0
OCCASIONAL FEELINGS OF UNSTEADINESS: 0

## 2023-11-12 NOTE — PROGRESS NOTES
Chief Complaint/HPI:  Cough congestion postnasal drainage headache      ROS otherwise negative aside from what was mentioned above in HPI.      Patient Active Problem List   Diagnosis    Abnormal Pap smear of cervix    Acute deep vein thrombosis (DVT) of right lower extremity after procedure (CMS/HCC)    Acute pain of left ear    Acute pain of right ear    Acute pain of right shoulder    Acute respiratory infection    Acute sinusitis    ASCUS of cervix with negative high risk HPV    Atypical chest pain    Cervical radiculopathy    Cervicalgia    Chronic hepatitis C (CMS/HCC)    Cigarette nicotine dependence    Constipation    COPD (chronic obstructive pulmonary disease) (CMS/HCC)    Costochondritis    Cough    Cyst, kidney, acquired    Disc degeneration, lumbar    Dizziness    EVANS (dyspnea on exertion)    Dysfunction of right eustachian tube    Effusion of joint, lower leg    Epigastric discomfort    Facial scar    Fatty liver    Fluttering heart    Frequent headaches    GERD (gastroesophageal reflux disease)    Hay fever    Palpitations    Hordeolum internum of right upper eyelid    Hydradenitis    Hyperlipidemia    Infectious lymphocytosis    Insomnia    Joint pain, knee    Left shoulder pain    Left upper quadrant pain    Lower back pain    Lumbar radiculopathy    Mass of axilla    Nausea in adult    Neck pain    NSVT (nonsustained ventricular tachycardia) (CMS/HCC)    Pain in left axilla    Pain in right axilla    Pain of right breast    Pap smear abnormality of cervix with LGSIL    Paroxysmal SVT (supraventricular tachycardia)    Primary localized osteoarthritis of knee    Primary osteoarthritis of right hip    Right hip pain    Right inguinal pain    Right knee pain    Right-sided low back pain with sciatica    Shoulder pain    Sinus pressure    Sprain of left shoulder    Stiffness of right hip joint    Thigh DVT (deep venous thrombosis) (CMS/HCC)    Urinary frequency    Vaginal discomfort    Ventricular  ectopics    Vitamin D deficiency     Past Medical History:   Diagnosis Date    Personal history of other infectious and parasitic diseases     History of hepatitis C     Past Surgical History:   Procedure Laterality Date    COLONOSCOPY  06/03/2013    Complete Colonoscopy    COLONOSCOPY  02/13/2014    Complete Colonoscopy    CT ANGIO CORONARY ART WITH HEARTFLOW IF SCORE >30%  7/9/2021    CT HEART CORONARY ANGIOGRAM 7/9/2021 [facility] EM[facility]GENCY LEGACY    ESOPHAGOGASTRODUODENOSCOPY  02/27/2014    Diagnostic Esophagogastroduodenoscopy    LAPAROSCOPY DIAGNOSTIC / BIOPSY / ASPIRATION / LYSIS  02/13/2014    Exploratory Laparoscopy    OTHER SURGICAL HISTORY  06/05/2013    Chest Tube Insertion     Family History   Problem Relation Name Age of Onset    Diabetes Mother      Hypertension Mother      Pneumonia Mother      Other (Ischemic heart disease) Mother      Lung cancer Father      Diabetes type I Father      Other (AIDS) Brother      Diabetes Brother      Hypertension Brother      Other (Stroke syndrome) Brother      Colon cancer Maternal Grandfather       Social History     Tobacco Use    Smoking status: Every Day     Packs/day: .25     Types: Cigarettes    Smokeless tobacco: Former   Substance Use Topics    Alcohol use: Never    Drug use: Never         ALLERGIES  No Known Allergies      MEDICATIONS  Current Outpatient Medications   Medication Sig Dispense Refill    albuterol 90 mcg/actuation inhaler Inhale 2 puffs every 4 hours if needed for wheezing. 18 g 3    ergocalciferol (Vitamin D-2) 1.25 MG (25612 UT) capsule Take 1 capsule (1,250 mcg) by mouth 1 (one) time per week.      eszopiclone (Lunesta) 2 mg tablet Take 1 tablet (2 mg) by mouth as needed at bedtime for sleep. 90 tablet 0    fluticasone (Flonase) 50 mcg/actuation nasal spray Administer 1 spray into each nostril 2 times a day. Shake gently. Before first use, prime pump. After use, clean tip and replace cap.      fluticasone-umeclidin-vilanter (TRELEGY-ELLIPTA)  100-62.5-25 mcg blister with device Inhale 1 puff once daily.      ibuprofen 800 mg tablet TAKE 1 TABLET (800 MG) BY MOUTH 3 TIMES A DAY. 90 tablet 1    mv-mn/folic ac/calcium/vit K1 (WOMEN'S 50 PLUS MULTIVITAMIN ORAL) Take 1 tablet by mouth once daily.      omeprazole (PriLOSEC) 40 mg DR capsule Take 1 capsule (40 mg) by mouth once daily.      rosuvastatin (Crestor) 10 mg tablet Take 1 tablet (10 mg) by mouth once daily. 90 tablet 1    amoxicillin-pot clavulanate (Augmentin) 875-125 mg tablet Take 1 tablet (875 mg) by mouth 2 times a day for 7 days. 14 tablet 0     No current facility-administered medications for this visit.         PHYSICAL EXAM  Visit Vitals  Blood Pressure 130/64   Pulse 79     .FLOWAMB[11   .FLOWAMB[14   Body mass index is 23.63 kg/m².  Gen: Alert, NAD  HEENT:  PERRLA, EOMI, conjunctiva and sclera normal in appearance  Respiratory:  Lungs CTAB  Cardiovascular:  Heart RRR. No M/R/G  Neuro:  Gross motor and sensory intact  Skin:  No suspicious lesions present    ASSESSMENT/PLAN  Problem List Items Addressed This Visit       Acute sinusitis - Primary    Relevant Medications    amoxicillin-pot clavulanate (Augmentin) 875-125 mg tablet           Armando Arnold MD

## 2023-11-14 ENCOUNTER — OFFICE VISIT (OUTPATIENT)
Dept: ORTHOPEDIC SURGERY | Facility: HOSPITAL | Age: 68
End: 2023-11-14
Payer: COMMERCIAL

## 2023-11-14 VITALS — WEIGHT: 145 LBS | BODY MASS INDEX: 24.16 KG/M2 | HEIGHT: 65 IN

## 2023-11-14 DIAGNOSIS — M16.11 PRIMARY OSTEOARTHRITIS OF RIGHT HIP: ICD-10-CM

## 2023-11-14 PROCEDURE — 1126F AMNT PAIN NOTED NONE PRSNT: CPT | Performed by: STUDENT IN AN ORGANIZED HEALTH CARE EDUCATION/TRAINING PROGRAM

## 2023-11-14 PROCEDURE — 1160F RVW MEDS BY RX/DR IN RCRD: CPT | Performed by: STUDENT IN AN ORGANIZED HEALTH CARE EDUCATION/TRAINING PROGRAM

## 2023-11-14 PROCEDURE — 0232T NJX PLATELET PLASMA: CPT | Mod: RT | Performed by: STUDENT IN AN ORGANIZED HEALTH CARE EDUCATION/TRAINING PROGRAM

## 2023-11-14 PROCEDURE — 1159F MED LIST DOCD IN RCRD: CPT | Performed by: STUDENT IN AN ORGANIZED HEALTH CARE EDUCATION/TRAINING PROGRAM

## 2023-11-14 PROCEDURE — 0232T NJX PLATELET PLASMA: CPT | Performed by: STUDENT IN AN ORGANIZED HEALTH CARE EDUCATION/TRAINING PROGRAM

## 2023-11-14 PROCEDURE — 4004F PT TOBACCO SCREEN RCVD TLK: CPT | Performed by: STUDENT IN AN ORGANIZED HEALTH CARE EDUCATION/TRAINING PROGRAM

## 2023-11-14 NOTE — PROGRESS NOTES
Pre-Procedure Diagnosis: right hip pain; right hip DJD  Post-Procedure Diagnosis: right hip pain; right hip DJD    Procedure: US-guided right intraarticular hip ACP injection    History of Present Illness: Viola Berman is a very pleasant 67 y.o. female with right hip pain secondary to right hip arthritis who is here today for an ultrasound guided right intraarticular hip ACP injection for improved pain control. She has gotten about 4-6 months of relief with previous corticosteroid injections.     Medications and allergies were reviewed with the patient. No contraindications were identified. The patient has not taken any anti-inflammatories in the past week.    Informed Consent  Following denial of allergy and review of potential side effects and complications including but not necessarily limited to infection, allergic reaction, local tissue breakdown, injury to soft tissue and/or nerves and seizure, the patient indicated understanding and agreed to proceed. The patient expressed understanding that this procedure is still considered experimental by the FDA and understood that it is not typically covered by insurance. The available literature and clinical experience were discussed.  Written consent was obtained.    Prior to the procedure, Dr. Cece Burns evaluated the right antecubital fossa for superficial vein to access for phlebotomy. The skin was prepped with an alcohol swab. An 18-gauge 1.5 inch needle was used to obtain 15 mL of venous blood into a prepackaged Arthrex ACP syringe. This was then loaded into the centrifuge and spun for 5 minutes to isolate an autologous condition plasma products. After 5 minutes 5 mL of ACP were obtained for the injection procedure.    Procedural Details  The use of direct ultrasound visualization of the needle (rather than a non-guided injection) was required to increase patient safety by excluding inadvertent intramuscular or intratendinous placement and minimizing  bleeding by avoiding osteochondral or vascular injury from the needle.  Additionally, the increased accuracy of placement may increase clinical effectiveness and will allow higher diagnostic specificity when evaluating effectiveness of this injection.    Using ultrasound, a pre-scan of the region was performed to identify the target structure.     The area was prepped with chlorhexidine, then re-examined using the same transducer, a sterile ultrasound transducer cover, and sterile ultrasound gel.    Procedural pause conducted to verify: Correct patient identity, procedure to be performed, and as applicable, correct side and site, correct patient position, and availability of implants, special equipment, or special requirements.    Procedure:   Transducer: Curvilinear array transducer.  Patient position: Supine with the hip in a neutral position.  Localization process: With the transducer in an anatomic transverse oblique plane, the anterior hip joint was localized in a short axis view and the femoral head/neck junction was localized in a long axis view.   Local anesthesia: Local anesthesia was obtained with 2 cc of 1% lidocaine.  Needle: A 25-gauge, 2-inch needle was used for local anesthesia and a 22-gauge, 3.5-inch needle was used for the injectate.  Approach: An inferolateral to superomedial, in plane, approach was used to guide the needle tip to the anterior joint recess at the head/neck junction. Os was contacted.   Injection/Aspiration: 5 cc of ACP was injected into the right hip joint without complication. Good flow was observed.    Post-procedural care:   The patient tolerated the procedure well. The patient was asked to ice for improved pain control and avoid submerging the area in water for the next 48 hours to help reduce the risk of infection. The patient was counseled to expect a potential increase in pain over the next few days given possible irritation from the blood products and was advised to take 1  or 2 acetaminophen 500 mg tablets up to 4 times per day if needed. Patient was asked to not take non-steroidal anti-inflammatory medications for 1 week. The patient verbalized understanding.     For the next 48 hours the patient will minimize walking and work on active range of motion multiple times per day. On day 3 following the procedure, the patient may begin moderate intensity, non-impact exercises. On day 8, the patient may gradually return to their normal exercise routine. The patient was instructed to call the office immediately if there are any questions or concerns. The patient will follow up virtually in 2 weeks and in person in 6 weeks.    PATIENT EDUCATION:  Education of the diagnosis and treatment plan was discussed at today´s appointment. A learning needs assessment was performed. The patient demonstrated understanding.

## 2023-11-16 DIAGNOSIS — K21.9 GASTROESOPHAGEAL REFLUX DISEASE WITHOUT ESOPHAGITIS: ICD-10-CM

## 2023-11-16 RX ORDER — OMEPRAZOLE 40 MG/1
40 CAPSULE, DELAYED RELEASE ORAL DAILY
Qty: 90 CAPSULE | Refills: 0 | Status: SHIPPED | OUTPATIENT
Start: 2023-11-16 | End: 2024-01-26

## 2023-11-28 ENCOUNTER — TELEMEDICINE (OUTPATIENT)
Dept: ORTHOPEDIC SURGERY | Facility: HOSPITAL | Age: 68
End: 2023-11-28
Payer: COMMERCIAL

## 2023-11-28 DIAGNOSIS — M54.16 LUMBAR RADICULOPATHY: ICD-10-CM

## 2023-11-28 DIAGNOSIS — M16.11 PRIMARY OSTEOARTHRITIS OF RIGHT HIP: Primary | ICD-10-CM

## 2023-11-28 PROCEDURE — 99441 PR PHYS/QHP TELEPHONE EVALUATION 5-10 MIN: CPT | Performed by: STUDENT IN AN ORGANIZED HEALTH CARE EDUCATION/TRAINING PROGRAM

## 2023-11-28 NOTE — PROGRESS NOTES
VIRTUAL VISIT:     REFERRAL SOURCE: No ref. provider found     CHIEF COMPLAINT: right hip pain    HISTORY OF PRESENT ILLNESS  Viola Berman is a very pleasant 67 y.o. female with history of COPD, hepatitis C, GERD, hyperlipidemia, DVT on Xarelto who presents for follow-up of right hip pain.     Previous history:   12/2/22: She was previously seen by Dr. Tompkins on 4/26/2021 for right hip pain and he performed an ultrasound-guided injection; his note was reviewed. She is doing well until August 2022 when she noted worsening of her right hip pain. At that time, she was also diagnosed with a proximal femoral DVT. She is unsure if her pain is coming from the DVT or her hip joint. She complains of pain primarily in the groin and medial thigh but also radiates to the buttock. She feels like there is swelling in this region. Initially in August she also noted swelling with a DVT. She feels like her symptoms are staying the same since August and have not worsened. She is on Xarelto for anticoagulation and has been consistently taking this medication. Her symptoms are worse with walking and she will get some pain at night as well. She will get some pain that radiates distally in the leg but does not past the knee. She denies numbness and tingling. She was previously very active, but her hip pain is currently limiting her activity.     She underwent ultrasound-guided right hip joint corticosteroid injection on 12/2/2022.     7/17/2023: She reported excellent relief with the previous injection that began to wear off a few weeks ago. Now, she has significant pain in a C-shaped around the joint with radiation into the groin that is worse with activity and improves with rest. She did attend physical therapy after her last visit and is currently doing a home exercise program.     She underwent ultrasound-guided right hip joint corticosteroid injection on 11/28/23.    11/14/2023: She underwent ultrasound-guided right hip joint  ACP/PRP injection.    Interval history:  11/28/23: She continues to have pain in the right hip. She is taking Tylenol, which helps. She is having more pain at night when lying down at night, which is new. The pain also goes down the leg. This has become more frequent.     MEDS    Current Outpatient Medications:     albuterol 90 mcg/actuation inhaler, Inhale 2 puffs every 4 hours if needed for wheezing., Disp: 18 g, Rfl: 3    ergocalciferol (Vitamin D-2) 1.25 MG (16414 UT) capsule, Take 1 capsule (1,250 mcg) by mouth 1 (one) time per week., Disp: , Rfl:     eszopiclone (Lunesta) 2 mg tablet, Take 1 tablet (2 mg) by mouth as needed at bedtime for sleep., Disp: 90 tablet, Rfl: 0    fluticasone (Flonase) 50 mcg/actuation nasal spray, Administer 1 spray into each nostril 2 times a day. Shake gently. Before first use, prime pump. After use, clean tip and replace cap., Disp: , Rfl:     fluticasone-umeclidin-vilanter (TRELEGY-ELLIPTA) 100-62.5-25 mcg blister with device, Inhale 1 puff once daily., Disp: , Rfl:     ibuprofen 800 mg tablet, TAKE 1 TABLET (800 MG) BY MOUTH 3 TIMES A DAY., Disp: 90 tablet, Rfl: 1    mv-mn/folic ac/calcium/vit K1 (WOMEN'S 50 PLUS MULTIVITAMIN ORAL), Take 1 tablet by mouth once daily., Disp: , Rfl:     omeprazole (PriLOSEC) 40 mg DR capsule, TAKE 1 CAPSULE BY MOUTH EVERY DAY, Disp: 90 capsule, Rfl: 0    rosuvastatin (Crestor) 10 mg tablet, Take 1 tablet (10 mg) by mouth once daily., Disp: 90 tablet, Rfl: 1    ALLERGIES  No Known Allergies    PAST MEDICAL HISTORY  Past Medical History:   Diagnosis Date    Personal history of other infectious and parasitic diseases     History of hepatitis C       PAST SURGICAL HISTORY  Past Surgical History:   Procedure Laterality Date    COLONOSCOPY  06/03/2013    Complete Colonoscopy    COLONOSCOPY  02/13/2014    Complete Colonoscopy    CT ANGIO CORONARY ART WITH HEARTFLOW IF SCORE >30%  7/9/2021    CT HEART CORONARY ANGIOGRAM 7/9/2021 [facility] [place] [facility]GENCY LEGACY     ESOPHAGOGASTRODUODENOSCOPY  02/27/2014    Diagnostic Esophagogastroduodenoscopy    LAPAROSCOPY DIAGNOSTIC / BIOPSY / ASPIRATION / LYSIS  02/13/2014    Exploratory Laparoscopy    OTHER SURGICAL HISTORY  06/05/2013    Chest Tube Insertion       SOCIAL HISTORY   Social History     Socioeconomic History    Marital status: Single     Spouse name: Not on file    Number of children: Not on file    Years of education: Not on file    Highest education level: Not on file   Occupational History    Not on file   Tobacco Use    Smoking status: Every Day     Packs/day: .25     Types: Cigarettes    Smokeless tobacco: Former   Substance and Sexual Activity    Alcohol use: Never    Drug use: Never    Sexual activity: Not on file   Other Topics Concern    Not on file   Social History Narrative    Not on file     Social Determinants of Health     Financial Resource Strain: Not on file   Food Insecurity: Not on file   Transportation Needs: Not on file   Physical Activity: Not on file   Stress: Not on file   Social Connections: Not on file   Intimate Partner Violence: Not on file   Housing Stability: Not on file       FAMILY HISTORY  Family History   Problem Relation Name Age of Onset    Diabetes Mother      Hypertension Mother      Pneumonia Mother      Other (Ischemic heart disease) Mother      Lung cancer Father      Diabetes type I Father      Other (AIDS) Brother      Diabetes Brother      Hypertension Brother      Other (Stroke syndrome) Brother      Colon cancer Maternal Grandfather         REVIEW OF SYSTEMS  Except for those mentioned in the history of present illness, and below, a complete review of systems is negative.     Review of Systems    VITALS  There were no vitals filed for this visit.    PHYSICAL EXAMINATION       IMAGING STUDIES:   Radiographs of the right hip dated 12/2/2022 - right hip osteoarthritis and a calcification of the anterior superior labrum.      IMPRESSION  #1  Acute exacerbation of chronic right hip  osteoarthritis  #2  Right lumbar radiculopathy    PLAN  The following was discussed with the patient:     Viola Berman is a very pleasant 67 y.o. female with history of COPD, hepatitis C, GERD, hyperlipidemia, DVT on Xarelto who presents with right hip pain due to acute exacerbation of chronic right hip osteoarthritis and new worsening of right lumbar radiculopathy.  -The diagnosis of hip arthritis was discussed in detail. The only cure for arthritis is a hip replacement. Without curing arthritis, we can improve the pain that the patient experiences and hopefully allow them to continue to do the activities that they enjoy.  -Physical therapy: Work on hip group and core strengthening and hip flexibility to maintain current ROM with PT. continue with her physical therapy home exercise program.  -Weight loss and physical activity: The benefits of weight loss and physical activity on improving pain experienced with arthritis were discussed.  -Medications: Continue to take Tylenol over the counter.  For her lumbar radiculopathy, we did discuss the utilization of gabapentin, but this has made her very sleepy in the past and she wished not to have it.  -Injections: Injections, such as corticosteroid and PRP, can be utilized. She underwent PRP/ACP injection on 11/14/23.  -Follow-up in 4 weeks.    The patient was counseled to remain active, but avoid activities that worsen symptoms. The patient was in agreement with this plan. All questions were answered to the best of my ability.    PATIENT EDUCATION:  Education was discussed at today's appointment. A learning needs assessment was performed.    Primary learner: Viola Berman  Barriers to learning: None  Preferred language: English  Learning preferences include: Seeing and doing.  Discussed: Diagnosis and treatment plan.  Demonstrated: Understanding of material discussed.  Patient education materials given: None.  Learner response: Learner demonstrated understanding.    This  note was dictated using Dragon speech recognition software and was not corrected for spelling or grammatical errors.    This visit was completed via telehealth due to restrictions of the COVID-19 pandemic (audio only). All issues were discussed and addressed but no physical exam was performed. If it was felt that the patient should be evaluated in clinic then they were directed there. The patient/guardian verbally consented to the visit. I spent a total of 7 minutes with the patient and more than 50% of this time was spent in counseling or coordination of care.

## 2023-12-12 ENCOUNTER — ANCILLARY PROCEDURE (OUTPATIENT)
Dept: RADIOLOGY | Facility: CLINIC | Age: 68
End: 2023-12-12
Payer: COMMERCIAL

## 2023-12-12 VITALS — HEIGHT: 65 IN | WEIGHT: 145.06 LBS | BODY MASS INDEX: 24.17 KG/M2

## 2023-12-12 DIAGNOSIS — Z12.31 BREAST CANCER SCREENING BY MAMMOGRAM: ICD-10-CM

## 2023-12-12 DIAGNOSIS — M54.12 CERVICAL RADICULOPATHY: ICD-10-CM

## 2023-12-12 PROCEDURE — 77063 BREAST TOMOSYNTHESIS BI: CPT | Mod: BILATERAL PROCEDURE | Performed by: RADIOLOGY

## 2023-12-12 PROCEDURE — 77067 SCR MAMMO BI INCL CAD: CPT | Mod: BILATERAL PROCEDURE | Performed by: RADIOLOGY

## 2023-12-12 PROCEDURE — 77067 SCR MAMMO BI INCL CAD: CPT

## 2023-12-12 RX ORDER — IBUPROFEN 800 MG/1
800 TABLET ORAL 3 TIMES DAILY
Qty: 90 TABLET | Refills: 1 | Status: SHIPPED | OUTPATIENT
Start: 2023-12-12 | End: 2024-03-20

## 2023-12-26 NOTE — PROGRESS NOTES
REFERRAL SOURCE: No ref. provider found     CHIEF COMPLAINT: right hip pain    HISTORY OF PRESENT ILLNESS  Viola Berman is a very pleasant 68 y.o. female with history of COPD, hepatitis C, GERD, hyperlipidemia, DVT on Xarelto who presents for follow-up of right hip pain.     Previous history:   12/2/22: She was previously seen by Dr. Tompkins on 4/26/2021 for right hip pain and he performed an ultrasound-guided injection; his note was reviewed. She is doing well until August 2022 when she noted worsening of her right hip pain. At that time, she was also diagnosed with a proximal femoral DVT. She is unsure if her pain is coming from the DVT or her hip joint. She complains of pain primarily in the groin and medial thigh but also radiates to the buttock. She feels like there is swelling in this region. Initially in August she also noted swelling with a DVT. She feels like her symptoms are staying the same since August and have not worsened. She is on Xarelto for anticoagulation and has been consistently taking this medication. Her symptoms are worse with walking and she will get some pain at night as well. She will get some pain that radiates distally in the leg but does not past the knee. She denies numbness and tingling. She was previously very active, but her hip pain is currently limiting her activity.     She underwent ultrasound-guided right hip joint corticosteroid injection on 12/2/2022.     7/17/2023: She reported excellent relief with the previous injection that began to wear off a few weeks ago. Now, she has significant pain in a C-shaped around the joint with radiation into the groin that is worse with activity and improves with rest. She did attend physical therapy after her last visit and is currently doing a home exercise program.     She underwent ultrasound-guided right hip joint corticosteroid injection on 11/28/23.    11/14/2023: She underwent ultrasound-guided right hip joint ACP/PRP  injection.    11/28/23: She continues to have pain in the right hip. She is taking Tylenol, which helps. She is having more pain at night when lying down at night, which is new. The pain also goes down the leg. This has become more frequent.     Interval history:  12/27/23: She has noted improvement in her right hip joint pain.  She did not have any pain when she woke up this morning, which is atypical for her.  Currently, pain in the right hip is 2/10, which is better than previously.  She is now getting some radiating pain down the lateral and posterior side of the leg and posterior into the foot.    MEDS    Current Outpatient Medications:     albuterol 90 mcg/actuation inhaler, Inhale 2 puffs every 4 hours if needed for wheezing., Disp: 18 g, Rfl: 3    ergocalciferol (Vitamin D-2) 1.25 MG (91563 UT) capsule, Take 1 capsule (1,250 mcg) by mouth 1 (one) time per week., Disp: , Rfl:     eszopiclone (Lunesta) 2 mg tablet, Take 1 tablet (2 mg) by mouth as needed at bedtime for sleep., Disp: 90 tablet, Rfl: 0    fluticasone (Flonase) 50 mcg/actuation nasal spray, Administer 1 spray into each nostril 2 times a day. Shake gently. Before first use, prime pump. After use, clean tip and replace cap., Disp: , Rfl:     fluticasone-umeclidin-vilanter (TRELEGY-ELLIPTA) 100-62.5-25 mcg blister with device, Inhale 1 puff once daily., Disp: , Rfl:     ibuprofen 800 mg tablet, TAKE 1 TABLET (800 MG) BY MOUTH 3 TIMES A DAY., Disp: 90 tablet, Rfl: 1    mv-mn/folic ac/calcium/vit K1 (WOMEN'S 50 PLUS MULTIVITAMIN ORAL), Take 1 tablet by mouth once daily., Disp: , Rfl:     omeprazole (PriLOSEC) 40 mg DR capsule, TAKE 1 CAPSULE BY MOUTH EVERY DAY, Disp: 90 capsule, Rfl: 0    rosuvastatin (Crestor) 10 mg tablet, Take 1 tablet (10 mg) by mouth once daily., Disp: 90 tablet, Rfl: 1    ALLERGIES  No Known Allergies    PAST MEDICAL HISTORY  Past Medical History:   Diagnosis Date    Personal history of other infectious and parasitic diseases      History of hepatitis C       PAST SURGICAL HISTORY  Past Surgical History:   Procedure Laterality Date    COLONOSCOPY  06/03/2013    Complete Colonoscopy    COLONOSCOPY  02/13/2014    Complete Colonoscopy    CT ANGIO CORONARY ART WITH HEARTFLOW IF SCORE >30%  7/9/2021    [place] HEART [place]ONARY [place] [facility] [place]    ESOPHAGOGASTRODUODENOSCOPY  02/27/2014    Diagnostic Esophagogastroduodenoscopy    LAPAROSCOPY DIAGNOSTIC / BIOPSY / ASPIRATION / LYSIS  02/13/2014    Exploratory Laparoscopy    OTHER SURGICAL HISTORY  06/05/2013    Chest Tube Insertion       SOCIAL HISTORY   Social History     Socioeconomic History    Marital status: Single     Spouse name: Not on file    Number of children: Not on file    Years of education: Not on file    Highest education level: Not on file   Occupational History    Not on file   Tobacco Use    Smoking status: Every Day     Packs/day: .25     Types: Cigarettes    Smokeless tobacco: Former   Substance and Sexual Activity    Alcohol use: Never    Drug use: Never    Sexual activity: Not on file   Other Topics Concern    Not on file   Social History Narrative    Not on file     Social Determinants of Health     Financial Resource Strain: Not on file   Food Insecurity: Not on file   Transportation Needs: Not on file   Physical Activity: Not on file   Stress: Not on file   Social Connections: Not on file   Intimate Partner Violence: Not on file   Housing Stability: Not on file       FAMILY HISTORY  Family History   Problem Relation Name Age of Onset    Diabetes Mother      Hypertension Mother      Pneumonia Mother      Other (Ischemic heart disease) Mother      Lung cancer Father      Diabetes type I Father      Other (AIDS) Brother      Diabetes Brother      Hypertension Brother      Other (Stroke syndrome) Brother      Colon cancer Maternal Grandfather         REVIEW OF SYSTEMS  Except for those mentioned in the history of present illness, and below, a complete review  of systems is negative.     Review of Systems    VITALS  There were no vitals filed for this visit.    PHYSICAL EXAMINATION   GENERAL: Awake, alert, and oriented, no apparent distress, pleasant, and cooperative  PSYC: Mood is euthymic, affect is congruent  EAR, NOSE, THROAT: Normocephalic, atraumatic, moist membranes, anicteric sclera  LUNG: Nonlabored breathing  HEART: No clubbing or cyanosis  SKIN: No increased erythema, warmth, rashes, or concerning skin lesions  NEURO: Sensation is intact in the bilateral upper and lower extremities. Strength is grossly 5 out of 5 throughout the bilateral upper and lower extremities, unless noted below.  Positive straight leg raise.  GAIT: Antalgic.  MSK: Examination of the right hip: Hip range of motion was limited in internal rotation to 20 degrees. Scour's and FAIR were negative. Stinchfield was negative. Log roll was negative. Maryana's was negative. Ganeslen´s and P4 are negative. No tenderness to palpation over the greater trochanteric region, ASIS, AIIS, adductor tendons, piriformis, ischial tuberosity. Sondra's was negative.     IMAGING STUDIES:   Radiographs of the right hip dated 12/2/2022 - right hip osteoarthritis and a calcification of the anterior superior labrum.      IMPRESSION  #1  Acute exacerbation of chronic right hip osteoarthritis  #2  Right lumbar radiculopathy    PLAN  The following was discussed with the patient:     Viola Berman is a very pleasant 68 y.o. female with history of COPD, hepatitis C, GERD, hyperlipidemia, DVT on Xarelto who presents with right hip pain due to acute exacerbation of chronic right hip osteoarthritis and new worsening of right lumbar radiculopathy.  -The diagnosis of hip arthritis was discussed in detail. The only cure for arthritis is a hip replacement. Without curing arthritis, we can improve the pain that the patient experiences and hopefully allow them to continue to do the activities that they enjoy.  -Physical therapy: Work  on hip group and core strengthening and hip flexibility to maintain current ROM with PT. continue with her physical therapy home exercise program.  -Weight loss and physical activity: The benefits of weight loss and physical activity on improving pain experienced with arthritis were discussed.  -Medications: Continue to take Tylenol over the counter.  For her lumbar radiculopathy, we did discuss the utilization of gabapentin, and at this time, she would like to try 300 mg at bedtime.  -Injections: Injections, such as corticosteroid and PRP, can be utilized. She underwent PRP/ACP injection on 11/14/23.  She is following the anticipated timeline and is actually somewhat ahead of schedule.  -Referrals: She was referred to medical spine for her right lumbar radiculopathy.  -Follow-up as needed.    The patient was counseled to remain active, but avoid activities that worsen symptoms. The patient was in agreement with this plan. All questions were answered to the best of my ability.    PATIENT EDUCATION:  Education was discussed at today's appointment. A learning needs assessment was performed.    Primary learner: Viola Berman  Barriers to learning: None  Preferred language: English  Learning preferences include: Seeing and doing.  Discussed: Diagnosis and treatment plan.  Demonstrated: Understanding of material discussed.  Patient education materials given: None.  Learner response: Learner demonstrated understanding.    This note was dictated using Dragon speech recognition software and was not corrected for spelling or grammatical errors.

## 2023-12-27 ENCOUNTER — OFFICE VISIT (OUTPATIENT)
Dept: ORTHOPEDIC SURGERY | Facility: HOSPITAL | Age: 68
End: 2023-12-27
Payer: COMMERCIAL

## 2023-12-27 DIAGNOSIS — M16.11 PRIMARY OSTEOARTHRITIS OF RIGHT HIP: Primary | ICD-10-CM

## 2023-12-27 DIAGNOSIS — M54.16 LUMBAR RADICULOPATHY: ICD-10-CM

## 2023-12-27 PROCEDURE — 1160F RVW MEDS BY RX/DR IN RCRD: CPT | Performed by: STUDENT IN AN ORGANIZED HEALTH CARE EDUCATION/TRAINING PROGRAM

## 2023-12-27 PROCEDURE — 99213 OFFICE O/P EST LOW 20 MIN: CPT | Performed by: STUDENT IN AN ORGANIZED HEALTH CARE EDUCATION/TRAINING PROGRAM

## 2023-12-27 PROCEDURE — 1126F AMNT PAIN NOTED NONE PRSNT: CPT | Performed by: STUDENT IN AN ORGANIZED HEALTH CARE EDUCATION/TRAINING PROGRAM

## 2023-12-27 PROCEDURE — 1159F MED LIST DOCD IN RCRD: CPT | Performed by: STUDENT IN AN ORGANIZED HEALTH CARE EDUCATION/TRAINING PROGRAM

## 2023-12-27 PROCEDURE — 4004F PT TOBACCO SCREEN RCVD TLK: CPT | Performed by: STUDENT IN AN ORGANIZED HEALTH CARE EDUCATION/TRAINING PROGRAM

## 2023-12-27 RX ORDER — GABAPENTIN 300 MG/1
300 CAPSULE ORAL NIGHTLY
Qty: 30 CAPSULE | Refills: 3 | Status: SHIPPED | OUTPATIENT
Start: 2023-12-27 | End: 2024-04-09 | Stop reason: SDUPTHER

## 2023-12-27 ASSESSMENT — PAIN - FUNCTIONAL ASSESSMENT: PAIN_FUNCTIONAL_ASSESSMENT: 0-10

## 2023-12-27 ASSESSMENT — PAIN SCALES - GENERAL: PAINLEVEL_OUTOF10: 2

## 2024-01-09 ENCOUNTER — OFFICE VISIT (OUTPATIENT)
Dept: PAIN MEDICINE | Facility: HOSPITAL | Age: 69
End: 2024-01-09
Payer: COMMERCIAL

## 2024-01-09 DIAGNOSIS — M25.551 RIGHT HIP PAIN: ICD-10-CM

## 2024-01-09 PROCEDURE — 99214 OFFICE O/P EST MOD 30 MIN: CPT | Performed by: ANESTHESIOLOGY

## 2024-01-09 PROCEDURE — 99204 OFFICE O/P NEW MOD 45 MIN: CPT | Performed by: ANESTHESIOLOGY

## 2024-01-09 PROCEDURE — 1125F AMNT PAIN NOTED PAIN PRSNT: CPT | Performed by: ANESTHESIOLOGY

## 2024-01-09 ASSESSMENT — PAIN SCALES - GENERAL: PAINLEVEL: 10-WORST PAIN EVER

## 2024-01-09 NOTE — PROGRESS NOTES
Chief Complaint   Patient presents with    Hip Pain     History Of Present Illness  Viola Berman is a 68 y.o. female presenting as a new patient pain clinic today referred to us by sports medicine for right-sided hip pain.  Patient says she has been receiving intra-articular steroid injections which initially provided excellent relief, up to 1 year, however now had decreasing efficacy.  She also had paid for PRP injections out-of-pocket however got minimal to no relief unfortunately.  She says the worst of her pain is when she gets into and out of bed, with pain radiating sharply to the groin.  She also intermittently has pain going down her leg to her foot, however she says that this is quite infrequent and does not seem to be consistent with ambulation or prolonged standing.  Patient at this time states that she does not want to be evaluated by surgery and wants to maximize nonsurgical interventions.       Past Medical History  Past Medical History:   Diagnosis Date    Personal history of other infectious and parasitic diseases     History of hepatitis C       Surgical History  Past Surgical History:   Procedure Laterality Date    COLONOSCOPY  06/03/2013    Complete Colonoscopy    COLONOSCOPY  02/13/2014    Complete Colonoscopy    CT ANGIO CORONARY ART WITH HEARTFLOW IF SCORE >30%  7/9/2021    CT HEART CORONARY ANGIOGRAM 7/9/2021 [facility] [place] [facility]    ESOPHAGOGASTRODUODENOSCOPY  02/27/2014    Diagnostic Esophagogastroduodenoscopy    LAPAROSCOPY DIAGNOSTIC / BIOPSY / ASPIRATION / LYSIS  02/13/2014    Exploratory Laparoscopy    OTHER SURGICAL HISTORY  06/05/2013    Chest Tube Insertion        Social History  She reports that she has been smoking cigarettes. She has been smoking an average of .25 packs per day. She has quit using smokeless tobacco. She reports that she does not drink alcohol and does not use drugs.    Family History  Family History   Problem Relation Name Age of Onset    Diabetes Mother       Hypertension Mother      Pneumonia Mother      Other (Ischemic heart disease) Mother      Lung cancer Father      Diabetes type I Father      Other (AIDS) Brother      Diabetes Brother      Hypertension Brother      Other (Stroke syndrome) Brother      Colon cancer Maternal Grandfather          Allergies  Patient has no known allergies.    Review of Systems   13 point ROS done and negative except for the above.   Physical Exam    PHYSICAL EXAM  Vitals signs reviewed  Constitutional:    General: Not in acute distress   Appearance: Normal appearance. Not ill-appearing.  HENT:   Head: Normocephalic and atraumatic  Eyes:   Conjunctiva/sclera normal  Cardiovascular:  No jugular venous distention bilaterally  No gross edema in lower extremities  Pulmonary:   Effort: No respiratory distress  Abdominal:  Abdomen appears nondistended  Musculoskeletal:   Leg roll positive in the right  Straight leg positive the right  Lee negative bilaterally  Patellar 2+ bilaterally  Skin:   General: Skin is warm and dry  Neurological:   General: No focal deficit present  Psychiatric:    Mood and Affect: Mood normal    Behavior: Behavior normal    Last Recorded Vitals  There were no vitals taken for this visit.    Relevant Results        ASSESSMENT:  Assessment/Plan   Problem List Items Addressed This Visit    None      Patient is a 68-year-old female who presents as a new patient pain clinic today for evaluation of right hip pain.  Plain films several years ago indicative of moderate osteoarthritis on the right.  Patient has received intra-articular steroid injections as well as PRP which while initially were successful have now decreased efficacy.  Recommended patient for surgical consultation given degree of pain, however she states she wishes to max out conservative measures first.  Will plan for right-sided Fem/obturator nerve block and potential radiofrequency ablation.  Patient completed 8 weeks of physical therapy and is active  in daily at home physician directed exercises.    Think she has 2 sources of pain and she is adamant that the pain with GRAY is the worst.  Reproduces groin pain with external rotation of the hip.  By far this pain is worse than the pain that she may feel in the leg that stems from her back.    PLAN:  -Schedule for right-sided Fem/obturator nerve block.  Risks, benefits and alternatives of the procedure were discussed with the patient who expressed understanding and agrees to proceed.    - Given intermittent radicular symptoms down right leg and a positive straight leg on the right, can consider advanced imaging of the low back she radiculopathy prove refractory.      The plan was discussed with the patient and they were invited to contact us back anytime with any questions or concerns and follow-up with us in the office as needed.    Daniel Sauer MD

## 2024-01-22 ENCOUNTER — HOSPITAL ENCOUNTER (OUTPATIENT)
Dept: RADIOLOGY | Facility: HOSPITAL | Age: 69
Discharge: HOME | End: 2024-01-22
Payer: COMMERCIAL

## 2024-01-22 VITALS
BODY MASS INDEX: 23.99 KG/M2 | HEART RATE: 63 BPM | TEMPERATURE: 97.6 F | WEIGHT: 144 LBS | RESPIRATION RATE: 18 BRPM | HEIGHT: 65 IN | OXYGEN SATURATION: 96 % | SYSTOLIC BLOOD PRESSURE: 129 MMHG | DIASTOLIC BLOOD PRESSURE: 52 MMHG

## 2024-01-22 DIAGNOSIS — R52 PAIN: ICD-10-CM

## 2024-01-22 DIAGNOSIS — M25.551 RIGHT HIP PAIN: ICD-10-CM

## 2024-01-22 PROCEDURE — 64450 NJX AA&/STRD OTHER PN/BRANCH: CPT | Performed by: ANESTHESIOLOGY

## 2024-01-22 PROCEDURE — 77003 FLUOROGUIDE FOR SPINE INJECT: CPT | Performed by: ANESTHESIOLOGY

## 2024-01-22 PROCEDURE — 64450 NJX AA&/STRD OTHER PN/BRANCH: CPT | Mod: RT

## 2024-01-22 PROCEDURE — 76942 ECHO GUIDE FOR BIOPSY: CPT

## 2024-01-22 ASSESSMENT — PAIN SCALES - GENERAL
PAINLEVEL_OUTOF10: 9
PAINLEVEL_OUTOF10: 4
PAINLEVEL_OUTOF10: 0 - NO PAIN
PAINLEVEL_OUTOF10: 5 - MODERATE PAIN
PAINLEVEL_OUTOF10: 4

## 2024-01-22 ASSESSMENT — PAIN - FUNCTIONAL ASSESSMENT
PAIN_FUNCTIONAL_ASSESSMENT: 0-10
PAIN_FUNCTIONAL_ASSESSMENT: 0-10

## 2024-01-22 ASSESSMENT — PAIN DESCRIPTION - DESCRIPTORS: DESCRIPTORS: ACHING

## 2024-01-22 NOTE — H&P
History Of Present Illness  Viola Berman is a 68 y.o. female presents for procedure state below. Endorses no changes in past medical history or medical health since last seen in clinic.     Past Medical History  She has a past medical history of Personal history of other infectious and parasitic diseases.    Surgical History  She has a past surgical history that includes Colonoscopy (06/03/2013); Other surgical history (06/05/2013); Esophagogastroduodenoscopy (02/27/2014); Colonoscopy (02/13/2014); Laparoscopy diagnostic / biopsy / aspiration / lysis (02/13/2014); and CT angio coronary art with heartflow if score >30% (7/9/2021).     Social History  She reports that she has been smoking cigarettes. She has been smoking an average of .25 packs per day. She has quit using smokeless tobacco. She reports that she does not drink alcohol and does not use drugs.    Family History  Family History   Problem Relation Name Age of Onset    Diabetes Mother      Hypertension Mother      Pneumonia Mother      Other (Ischemic heart disease) Mother      Lung cancer Father      Diabetes type I Father      Other (AIDS) Brother      Diabetes Brother      Hypertension Brother      Other (Stroke syndrome) Brother      Colon cancer Maternal Grandfather          Allergies  Patient has no known allergies.    Review of Symptoms:   Constitutional: Negative for chills, diaphoresis or fever  HENT: Negative for neck swelling  Eyes:.  Negative for eye pain  Respiratory:.  Negative for cough, shortness of breath or wheezing    Cardiovascular:.  Negative for chest pain or palpitations  Gastrointestinal:.  Negative for abdominal pain, nausea and vomiting  Genitourinary:.  Negative for urgency  Musculoskeletal:  Positive for back pain. Positive for joint pain. Denies falls within the past 3 months.  Skin: Negative for wounds or itching   Neurological: Negative for dizziness, seizures, loss of consciousness and weakness  Endo/Heme/Allergies: Does not  bruise/bleed easily  Psychiatric/Behavioral: Negative for depression. The patient does not appear anxious.       PHYSICAL EXAM  Vitals signs reviewed  Constitutional:       General: Not in acute distress     Appearance: Normal appearance. Not ill-appearing.  HENT:     Head: Normocephalic and atraumatic  Eyes:     Conjunctiva/sclera: Conjunctivae normal  Cardiovascular:     Rate and Rhythm: Normal rate and regular rhythm  Pulmonary:     Effort: No respiratory distress  Abdominal:     Palpations: Abdomen is soft  Musculoskeletal: IBARRA  Skin:     General: Skin is warm and dry  Neurological:     General: No focal deficit present  Psychiatric:         Mood and Affect: Mood normal         Behavior: Behavior normal     Last Recorded Vitals  There were no vitals taken for this visit.    Relevant Results  Current Outpatient Medications   Medication Instructions    albuterol 90 mcg/actuation inhaler 2 puffs, inhalation, Every 4 hours PRN    ergocalciferol (Vitamin D-2) 1.25 MG (55179 UT) capsule 1 capsule, oral, Weekly    eszopiclone (LUNESTA) 2 mg, oral, Nightly PRN    fluticasone (Flonase) 50 mcg/actuation nasal spray 1 spray, Each Nostril, 2 times daily, Shake gently. Before first use, prime pump. After use, clean tip and replace cap.    fluticasone-umeclidin-vilanter (TRELEGY-ELLIPTA) 100-62.5-25 mcg blister with device 1 puff, inhalation, Daily RT    gabapentin (NEURONTIN) 300 mg, oral, Nightly    ibuprofen 800 mg, oral, 3 times daily    mv-mn/folic ac/calcium/vit K1 (WOMEN'S 50 PLUS MULTIVITAMIN ORAL) 1 tablet, oral, Daily    omeprazole (PRILOSEC) 40 mg, oral, Daily    rosuvastatin (CRESTOR) 10 mg, oral, Daily       No results found for this or any previous visit from the past 1000 days.     No image results found.       1. Right hip pain  FL pain management TC    FL pain management TC    Nerve Block    Nerve Block           ASSESSMENT/PLAN  Viola Berman is a 68 y.o. female presents for right-sided Femoral and obturator  nerve block.     Our plan is as follows:  - Follow In pain clinic  - Continue to participate in physical therapy as well as physician directed home exercises  - Continue pain medications as prescribed       Pierre Deleon MD

## 2024-01-22 NOTE — PROCEDURES
Preoperative diagnosis/postoperative diagnosis: Right-sided hip pain  Procedure: Right-sided femoral obturator sensory branch nerve block under ultrasound and fluoroscopic guidance, #2  Surgeon: Casandra Chris  Assistant:  Fellow, Pierre Deleon  Anesthesia: Local plus 2 mg midazolam  Complications: Apparently none     Clinical note: Ms. Berman is a 68-year-old female with a history of right-sided hip pain.  The patient is here today for a diagnostic femoral obturator sensory nerve branch block.     Procedure note: The patient was met in the preoperative holding area after risks benefits and alternatives to procedure were discussed with the patient, informed consent was obtained. Patient brought back to the procedure room and she positioned herself in the supine position on the fluoroscopy table. Area over the left lateral hip and groin area was exposed, prepped, and draped, in the usual sterile fashion.  Skin and subcutaneous tissues to the expected location of the aforementioned nerves were anesthetized using 0.5% lidocaine after marking them out with fluoroscopic guidance.  Chiba needles were inserted in the skin and advanced under ultrasound and fluoroscopic guidance.  Ultrasound was utilized to avoid the neurovascular structures, needle was guided under without penetration through the neurovascular structures.  Needle tip position confirmed and a drop of contrast was injected which showed appropriate localized spread. A total of 2 mL of local anesthetic was injected in divided doses among the 2 needles. Needles removed, bandage applied, patient tolerated the procedure well with no immediate complications.  Will focus in on how she does right after the procedure and if he sees relief, may consider radiofrequency ablation in the future.

## 2024-01-26 DIAGNOSIS — K21.9 GASTROESOPHAGEAL REFLUX DISEASE WITHOUT ESOPHAGITIS: ICD-10-CM

## 2024-01-26 RX ORDER — OMEPRAZOLE 40 MG/1
40 CAPSULE, DELAYED RELEASE ORAL DAILY
Qty: 90 CAPSULE | Refills: 0 | Status: SHIPPED | OUTPATIENT
Start: 2024-01-26 | End: 2024-04-16 | Stop reason: SDUPTHER

## 2024-01-29 ENCOUNTER — TELEPHONE (OUTPATIENT)
Dept: PAIN MEDICINE | Facility: HOSPITAL | Age: 69
End: 2024-01-29
Payer: COMMERCIAL

## 2024-01-29 DIAGNOSIS — M25.551 RIGHT HIP PAIN: ICD-10-CM

## 2024-01-29 NOTE — TELEPHONE ENCOUNTER
Ms Cullen called to report 80% reduction in pain following the nerve block. She would like second block- scheduled.

## 2024-02-12 ENCOUNTER — APPOINTMENT (OUTPATIENT)
Dept: RADIOLOGY | Facility: HOSPITAL | Age: 69
End: 2024-02-12
Payer: COMMERCIAL

## 2024-02-12 ENCOUNTER — HOSPITAL ENCOUNTER (OUTPATIENT)
Dept: RADIOLOGY | Facility: HOSPITAL | Age: 69
Discharge: HOME | End: 2024-02-12
Payer: COMMERCIAL

## 2024-02-12 VITALS
DIASTOLIC BLOOD PRESSURE: 64 MMHG | RESPIRATION RATE: 16 BRPM | HEART RATE: 63 BPM | TEMPERATURE: 97.7 F | OXYGEN SATURATION: 97 % | SYSTOLIC BLOOD PRESSURE: 130 MMHG

## 2024-02-12 DIAGNOSIS — M25.651 STIFFNESS OF RIGHT HIP JOINT: ICD-10-CM

## 2024-02-12 DIAGNOSIS — M25.551 RIGHT HIP PAIN: ICD-10-CM

## 2024-02-12 PROCEDURE — 64450 NJX AA&/STRD OTHER PN/BRANCH: CPT | Mod: RT | Performed by: ANESTHESIOLOGY

## 2024-02-12 PROCEDURE — 64450 NJX AA&/STRD OTHER PN/BRANCH: CPT | Performed by: ANESTHESIOLOGY

## 2024-02-12 PROCEDURE — 77003 FLUOROGUIDE FOR SPINE INJECT: CPT | Performed by: ANESTHESIOLOGY

## 2024-02-12 PROCEDURE — 77003 FLUOROGUIDE FOR SPINE INJECT: CPT

## 2024-02-12 PROCEDURE — 2500000004 HC RX 250 GENERAL PHARMACY W/ HCPCS (ALT 636 FOR OP/ED): Performed by: ANESTHESIOLOGY

## 2024-02-12 RX ORDER — MIDAZOLAM HYDROCHLORIDE 1 MG/ML
INJECTION INTRAMUSCULAR; INTRAVENOUS
Status: COMPLETED | OUTPATIENT
Start: 2024-02-12 | End: 2024-02-12

## 2024-02-12 RX ADMIN — MIDAZOLAM HYDROCHLORIDE 2 MG: 1 INJECTION INTRAMUSCULAR; INTRAVENOUS at 09:08

## 2024-02-12 ASSESSMENT — PAIN SCALES - GENERAL
PAINLEVEL_OUTOF10: 4
PAINLEVEL_OUTOF10: 4
PAINLEVEL_OUTOF10: 0 - NO PAIN
PAINLEVEL_OUTOF10: 0 - NO PAIN
PAINLEVEL_OUTOF10: 4
PAINLEVEL_OUTOF10: 4

## 2024-02-12 ASSESSMENT — PAIN - FUNCTIONAL ASSESSMENT
PAIN_FUNCTIONAL_ASSESSMENT: 0-10

## 2024-02-12 NOTE — PROCEDURES
Preoperative diagnosis/postoperative diagnosis:  Operation/procedure: Right, femoral/obturator sensory nerve block under fluoroscopic and ultrasound guidance, diagnostic block #2  Surgeon: Aries  Assistant: Fellow, Cici Burton  Anesthesia: Local, IV sedation  Complications: Apparently none    Clinical note: Ms. Berman is a 68-year-old female with a history of hip pain who presents today for the aforementioned procedure.  The patient had 1 diagnostic block which totally resolved her pain and is here today for second procedure which is helpful to radiofrequency ablation.    Operation/procedure: The patient was met in the preoperative holding area and after risks, benefits, and alternatives reviewed with the patient, informed written consent was obtained.  The patient scooted over from her hospital cart and positioned herself in the supine position on the fluoroscopy table.  The area over the anterior and lateral hip was exposed, prepped, and draped in the usual sterile fashion.  Chlorhexidine utilized.  Thereafter using a combination of ultrasound and fluoroscopic guidance Chiba needles were inserted in the skin and advanced to the expected locations of the femoral and obturator sensory branches on the right-hand side.  Positioning was confirmed in the final position with x-ray and contrast which showed localized spread.  Throughout the entire procedure there was intermittent fluoroscopy used and anytime the needles were advanced ultrasound guidance was used to guide the needles and avoid the neurovascular bundle in the groin, which was done successfully.  In the final position 0.5 mL of bupivacaine was injected.  Needle was removed, bandage applied, tolerated the procedure well with no immediate complications.    Will assess the patient before they leave.

## 2024-02-12 NOTE — H&P
Pain Management H&P    History Of Present Illness  Viola Berman is a 68 y.o. female with a past medical history of right hip pain who presents for right-sided femoral obturator sensory nerve block under fluoroscopic guidance      Past Medical History  She has a past medical history of Personal history of other infectious and parasitic diseases.    Surgical History  She has a past surgical history that includes Colonoscopy (06/03/2013); Other surgical history (06/05/2013); Esophagogastroduodenoscopy (02/27/2014); Colonoscopy (02/13/2014); Laparoscopy diagnostic / biopsy / aspiration / lysis (02/13/2014); and CT angio coronary art with heartflow if score >30% (7/9/2021).     Social History  She reports that she has been smoking cigarettes. She has been smoking an average of .25 packs per day. She has quit using smokeless tobacco. She reports that she does not drink alcohol and does not use drugs.    Family History  Family History   Problem Relation Name Age of Onset    Diabetes Mother      Hypertension Mother      Pneumonia Mother      Other (Ischemic heart disease) Mother      Lung cancer Father      Diabetes type I Father      Other (AIDS) Brother      Diabetes Brother      Hypertension Brother      Other (Stroke syndrome) Brother      Colon cancer Maternal Grandfather          Allergies  Patient has no known allergies.    Review of Symptoms:   Constitutional: Negative for chills, diaphoresis or fever  HENT: Negative for neck swelling  Eyes:.  Negative for eye pain  Respiratory:.  Negative for cough, shortness of breath or wheezing    Cardiovascular:.  Negative for chest pain or palpitations  Gastrointestinal:.  Negative for abdominal pain, nausea and vomiting  Genitourinary:.  Negative for urgency  Musculoskeletal:  Positive for back pain. Positive for joint pain. Denies falls within the past 3 months.  Skin: Negative for wounds or itching   Neurological: Negative for dizziness, seizures, loss of consciousness and  weakness  Endo/Heme/Allergies: Does not bruise/bleed easily  Psychiatric/Behavioral: Negative for depression. The patient does not appear anxious.       PHYSICAL EXAM  Vitals signs reviewed  Constitutional:       General: Not in acute distress     Appearance: Normal appearance. Not ill-appearing.  HENT:     Head: Normocephalic and atraumatic  Eyes:     Conjunctiva/sclera: Conjunctivae normal  Cardiovascular:     Rate and Rhythm: Normal rate and regular rhythm  Pulmonary:     Effort: No respiratory distress  Abdominal:     Palpations: Abdomen is soft  Musculoskeletal: IBARRA  Skin:     General: Skin is warm and dry  Neurological:     General: No focal deficit present  Psychiatric:         Mood and Affect: Mood normal         Behavior: Behavior normal       Last Recorded Vitals  /68   Pulse 69   Temp 36.7 °C (98 °F) (Temporal)   Resp 16   SpO2 96%     Relevant Results  Current Outpatient Medications   Medication Instructions    albuterol 90 mcg/actuation inhaler 2 puffs, inhalation, Every 4 hours PRN    ergocalciferol (Vitamin D-2) 1.25 MG (58282 UT) capsule 1 capsule, oral, Weekly    eszopiclone (LUNESTA) 2 mg, oral, Nightly PRN    fluticasone (Flonase) 50 mcg/actuation nasal spray 1 spray, Each Nostril, 2 times daily, Shake gently. Before first use, prime pump. After use, clean tip and replace cap.    fluticasone-umeclidin-vilanter (TRELEGY-ELLIPTA) 100-62.5-25 mcg blister with device 1 puff, inhalation, Daily RT    gabapentin (NEURONTIN) 300 mg, oral, Nightly    ibuprofen 800 mg, oral, 3 times daily    mv-mn/folic ac/calcium/vit K1 (WOMEN'S 50 PLUS MULTIVITAMIN ORAL) 1 tablet, oral, Daily    omeprazole (PRILOSEC) 40 mg, oral, Daily    rosuvastatin (CRESTOR) 10 mg, oral, Daily       No results found for this or any previous visit from the past 1000 days.     No image results found.       1. Right hip pain  FL pain management TC    FL pain management TC    Nerve Block    Nerve Block            ASSESSMENT/PLAN  Viola Berman is a 68 y.o. female with a past medical history of right hip pain who presents for right-sided femoral obturator sensory nerve block under fluoroscopic guidance #2    Risks, benefits, alternatives discussed. All questions answered to the best of my ability. Patient agrees to proceed.      Our plan is as follows:  - Proceed with aforementioned procedure       Cici Burton DO   Pain fellow

## 2024-02-12 NOTE — PROGRESS NOTES
Ms Terell Berman states that she had greater than 75% reduction in pain and would like to move forward with ablation. Scheduled.

## 2024-02-26 ENCOUNTER — HOSPITAL ENCOUNTER (OUTPATIENT)
Dept: RADIOLOGY | Facility: HOSPITAL | Age: 69
Discharge: HOME | End: 2024-02-26
Payer: COMMERCIAL

## 2024-02-26 ENCOUNTER — HOSPITAL ENCOUNTER (OUTPATIENT)
Dept: RADIOLOGY | Facility: HOSPITAL | Age: 69
End: 2024-02-26
Payer: COMMERCIAL

## 2024-02-26 VITALS
HEART RATE: 61 BPM | DIASTOLIC BLOOD PRESSURE: 52 MMHG | WEIGHT: 140 LBS | TEMPERATURE: 97.9 F | HEIGHT: 65 IN | SYSTOLIC BLOOD PRESSURE: 108 MMHG | RESPIRATION RATE: 17 BRPM | BODY MASS INDEX: 23.32 KG/M2 | OXYGEN SATURATION: 96 %

## 2024-02-26 DIAGNOSIS — M25.651 STIFFNESS OF RIGHT HIP JOINT: ICD-10-CM

## 2024-02-26 PROCEDURE — 77003 FLUOROGUIDE FOR SPINE INJECT: CPT | Performed by: ANESTHESIOLOGY

## 2024-02-26 PROCEDURE — 2500000004 HC RX 250 GENERAL PHARMACY W/ HCPCS (ALT 636 FOR OP/ED): Performed by: ANESTHESIOLOGY

## 2024-02-26 PROCEDURE — 64640 INJECTION TREATMENT OF NERVE: CPT | Performed by: ANESTHESIOLOGY

## 2024-02-26 PROCEDURE — 2720000007 HC OR 272 NO HCPCS

## 2024-02-26 PROCEDURE — 99152 MOD SED SAME PHYS/QHP 5/>YRS: CPT | Performed by: ANESTHESIOLOGY

## 2024-02-26 PROCEDURE — 64640 INJECTION TREATMENT OF NERVE: CPT | Mod: RT | Performed by: ANESTHESIOLOGY

## 2024-02-26 RX ORDER — MIDAZOLAM HYDROCHLORIDE 1 MG/ML
INJECTION INTRAMUSCULAR; INTRAVENOUS
Status: COMPLETED | OUTPATIENT
Start: 2024-02-26 | End: 2024-02-26

## 2024-02-26 RX ORDER — FENTANYL CITRATE 50 UG/ML
INJECTION, SOLUTION INTRAMUSCULAR; INTRAVENOUS
Status: COMPLETED | OUTPATIENT
Start: 2024-02-26 | End: 2024-02-26

## 2024-02-26 RX ADMIN — FENTANYL CITRATE 50 MCG: 50 INJECTION, SOLUTION INTRAMUSCULAR; INTRAVENOUS at 09:33

## 2024-02-26 RX ADMIN — MIDAZOLAM HYDROCHLORIDE 2 MG: 1 INJECTION INTRAMUSCULAR; INTRAVENOUS at 09:33

## 2024-02-26 RX ADMIN — FENTANYL CITRATE 50 MCG: 50 INJECTION, SOLUTION INTRAMUSCULAR; INTRAVENOUS at 09:43

## 2024-02-26 ASSESSMENT — PAIN - FUNCTIONAL ASSESSMENT
PAIN_FUNCTIONAL_ASSESSMENT: 0-10

## 2024-02-26 ASSESSMENT — PAIN SCALES - GENERAL
PAINLEVEL_OUTOF10: 6
PAINLEVEL_OUTOF10: 0 - NO PAIN
PAINLEVEL_OUTOF10: 6
PAINLEVEL_OUTOF10: 0 - NO PAIN
PAINLEVEL_OUTOF10: 6
PAINLEVEL_OUTOF10: 6
PAINLEVEL_OUTOF10: 8

## 2024-02-26 NOTE — H&P
Pain Management H&P    History Of Present Illness  Viola Berman is a 68 y.o. female with a past medical history of hip pain who presents for right femoral/obturator sensory nerve radiofrequency ablation. Had 2 successful diagnostic blocks.     Past Medical History  She has a past medical history of Personal history of other infectious and parasitic diseases.    Surgical History  She has a past surgical history that includes Colonoscopy (06/03/2013); Other surgical history (06/05/2013); Esophagogastroduodenoscopy (02/27/2014); Colonoscopy (02/13/2014); Laparoscopy diagnostic / biopsy / aspiration / lysis (02/13/2014); and CT angio coronary art with heartflow if score >30% (7/9/2021).     Social History  She reports that she has been smoking cigarettes. She has been smoking an average of .25 packs per day. She has quit using smokeless tobacco. She reports that she does not drink alcohol and does not use drugs.    Family History  Family History   Problem Relation Name Age of Onset    Diabetes Mother      Hypertension Mother      Pneumonia Mother      Other (Ischemic heart disease) Mother      Lung cancer Father      Diabetes type I Father      Other (AIDS) Brother      Diabetes Brother      Hypertension Brother      Other (Stroke syndrome) Brother      Colon cancer Maternal Grandfather          Allergies  Patient has no known allergies.    Review of Symptoms:   Constitutional: Negative for chills, diaphoresis or fever  HENT: Negative for neck swelling  Eyes:.  Negative for eye pain  Respiratory:.  Negative for cough, shortness of breath or wheezing    Cardiovascular:.  Negative for chest pain or palpitations  Gastrointestinal:.  Negative for abdominal pain, nausea and vomiting  Genitourinary:.  Negative for urgency  Musculoskeletal:  Positive for hip pain. Positive for joint pain. Denies falls within the past 3 months.  Skin: Negative for wounds or itching   Neurological: Negative for dizziness, seizures, loss of  consciousness and weakness  Endo/Heme/Allergies: Does not bruise/bleed easily  Psychiatric/Behavioral: Negative for depression. The patient does not appear anxious.       PHYSICAL EXAM  Vitals signs reviewed  Constitutional:       General: Not in acute distress     Appearance: Normal appearance. Not ill-appearing.  HENT:     Head: Normocephalic and atraumatic  Eyes:     Conjunctiva/sclera: Conjunctivae normal  Cardiovascular:     Rate and Rhythm: Normal rate and regular rhythm  Pulmonary:     Effort: No respiratory distress  Abdominal:     Palpations: Abdomen is soft  Musculoskeletal: IBARRA  Skin:     General: Skin is warm and dry  Neurological:     General: No focal deficit present  Psychiatric:         Mood and Affect: Mood normal         Behavior: Behavior normal     Last Recorded Vitals  There were no vitals taken for this visit.    Relevant Results  Current Outpatient Medications   Medication Instructions    albuterol 90 mcg/actuation inhaler 2 puffs, inhalation, Every 4 hours PRN    ergocalciferol (Vitamin D-2) 1.25 MG (89164 UT) capsule 1 capsule, oral, Weekly    eszopiclone (LUNESTA) 2 mg, oral, Nightly PRN    fluticasone (Flonase) 50 mcg/actuation nasal spray 1 spray, Each Nostril, 2 times daily, Shake gently. Before first use, prime pump. After use, clean tip and replace cap.    fluticasone-umeclidin-vilanter (TRELEGY-ELLIPTA) 100-62.5-25 mcg blister with device 1 puff, inhalation, Daily RT    gabapentin (NEURONTIN) 300 mg, oral, Nightly    ibuprofen 800 mg, oral, 3 times daily    mv-mn/folic ac/calcium/vit K1 (WOMEN'S 50 PLUS MULTIVITAMIN ORAL) 1 tablet, oral, Daily    omeprazole (PRILOSEC) 40 mg, oral, Daily    rosuvastatin (CRESTOR) 10 mg, oral, Daily       No results found for this or any previous visit from the past 1000 days.     No image results found.       1. Stiffness of right hip joint  FL pain management TC    FL pain management TC    Radiofrequency Ablation    Radiofrequency Ablation            ASSESSMENT/PLAN  Viola Berman is a 68 y.o. female with a past medical history of hip pain who presents for right femoral/obturator sensory nerve radiofrequency ablation. Had 2 successful diagnostic blocks.    Risks, benefits, alternatives discussed. All questions answered to the best of my ability. Patient agrees to proceed.      Our plan is as follows:  - Proceed with aforementioned procedure     Cici Burton DO   Pain fellow

## 2024-02-26 NOTE — PROCEDURES
Preoperative diagnosis: Hip pain/osteoarthritis  Postoperative diagnosis: Right-sided hip pain/osteoarthritis  Operation/procedure: Right-sided femoral/obturator sensory radiofrequency ablation under fluoroscopic and ultrasound guidance  Surgeon: Aries  Assistant: Fellow, Cici Burton  Anesthesia: Local, midazolam plus IV fentanyl  Complications: Apparently none    Clinical note: Ms. Berman is a 68-year-old female with a history of right-sided hip pain who had 2 diagnostic blocks with good relief and presents for the radiofrequency procedure.    Operation/procedure: The patient was met in the preoperative holding area and after risks, benefits, and alternatives reviewed with the patient, informed written consent was obtained.  Patient was brought back to procedure room and placed in the supine position on the fluoroscopy table.  The area over the anterior and lateral hip was exposed, prepped, and draped in the usual sterile fashion.  Chlorhexidine utilized.  Thereafter using a combination of ultrasound and fluoroscopic guidance radiofrequency needles were inserted in the skin and advanced to the expected locations of the femoral and obturator sensory branches.  Positioning was confirmed in the final position with x-ray and intermittent live fluoroscopy was utilized to avoid and guide the needles under the neurovascular bundle.  Sensory stimulation was done for both needles and achieved at less than 0.4 V at 50 Hz and negative motor stimulation up to 1.5 V at 2 Hz.  At that point 1 cc of 2% lidocaine was injected through each needle.  After 1 minute lesioning was done at 80 degrees for 90 seconds x 2.  Needles removed, bandage applied, tolerated the procedure well with no immediate complication.

## 2024-02-29 ENCOUNTER — TELEPHONE (OUTPATIENT)
Dept: PAIN MEDICINE | Facility: HOSPITAL | Age: 69
End: 2024-02-29
Payer: COMMERCIAL

## 2024-02-29 DIAGNOSIS — R11.0 NAUSEA: ICD-10-CM

## 2024-02-29 RX ORDER — ONDANSETRON 4 MG/1
4 TABLET, ORALLY DISINTEGRATING ORAL EVERY 8 HOURS PRN
Qty: 3 TABLET | Refills: 0 | Status: SHIPPED | OUTPATIENT
Start: 2024-02-29

## 2024-02-29 NOTE — TELEPHONE ENCOUNTER
Called Ms Cullen- she still endorses nausea 3 days after procedure. Tolerating liquids. Dr Chris aware and will send a couple anti emetic pills to pharmacy. Advised pt to advance diet as tolerated and follow up with PCP if nausea doesn't resolve. She states hip feels great after procedure. CK

## 2024-03-19 DIAGNOSIS — M54.12 CERVICAL RADICULOPATHY: ICD-10-CM

## 2024-03-20 RX ORDER — IBUPROFEN 800 MG/1
800 TABLET ORAL 3 TIMES DAILY
Qty: 90 TABLET | Refills: 1 | Status: SHIPPED | OUTPATIENT
Start: 2024-03-20

## 2024-04-09 ENCOUNTER — OFFICE VISIT (OUTPATIENT)
Dept: ORTHOPEDIC SURGERY | Facility: HOSPITAL | Age: 69
End: 2024-04-09
Payer: COMMERCIAL

## 2024-04-09 DIAGNOSIS — M16.11 ARTHRITIS OF RIGHT HIP: Primary | ICD-10-CM

## 2024-04-09 DIAGNOSIS — M54.16 LUMBAR RADICULOPATHY: ICD-10-CM

## 2024-04-09 PROCEDURE — 99213 OFFICE O/P EST LOW 20 MIN: CPT | Performed by: STUDENT IN AN ORGANIZED HEALTH CARE EDUCATION/TRAINING PROGRAM

## 2024-04-09 PROCEDURE — 1159F MED LIST DOCD IN RCRD: CPT | Performed by: STUDENT IN AN ORGANIZED HEALTH CARE EDUCATION/TRAINING PROGRAM

## 2024-04-09 RX ORDER — GABAPENTIN 300 MG/1
300 CAPSULE ORAL NIGHTLY
Qty: 30 CAPSULE | Refills: 3 | Status: SHIPPED | OUTPATIENT
Start: 2024-04-09 | End: 2024-04-16 | Stop reason: WASHOUT

## 2024-04-09 NOTE — PROGRESS NOTES
REFERRAL SOURCE: No ref. provider found     CHIEF COMPLAINT: right hip pain    HISTORY OF PRESENT ILLNESS  Viola Berman is a very pleasant 68 y.o. female with history of COPD, hepatitis C, GERD, hyperlipidemia, DVT on Xarelto who presents for follow-up of right hip pain.     Previous history:   12/2/22: She was previously seen by Dr. Tompkins on 4/26/2021 for right hip pain and he performed an ultrasound-guided injection; his note was reviewed. She is doing well until August 2022 when she noted worsening of her right hip pain. At that time, she was also diagnosed with a proximal femoral DVT. She is unsure if her pain is coming from the DVT or her hip joint. She complains of pain primarily in the groin and medial thigh but also radiates to the buttock. She feels like there is swelling in this region. Initially in August she also noted swelling with a DVT. She feels like her symptoms are staying the same since August and have not worsened. She is on Xarelto for anticoagulation and has been consistently taking this medication. Her symptoms are worse with walking and she will get some pain at night as well. She will get some pain that radiates distally in the leg but does not past the knee. She denies numbness and tingling. She was previously very active, but her hip pain is currently limiting her activity.     She underwent ultrasound-guided right hip joint corticosteroid injection on 12/2/2022.     7/17/2023: She reported excellent relief with the previous injection that began to wear off a few weeks ago. Now, she has significant pain in a C-shaped around the joint with radiation into the groin that is worse with activity and improves with rest. She did attend physical therapy after her last visit and is currently doing a home exercise program.     She underwent ultrasound-guided right hip joint corticosteroid injection on 11/28/23.    11/14/2023: She underwent ultrasound-guided right hip joint ACP/PRP  injection.    11/28/23: She continues to have pain in the right hip. She is taking Tylenol, which helps. She is having more pain at night when lying down at night, which is new. The pain also goes down the leg. This has become more frequent.     12/27/23: She has noted improvement in her right hip joint pain.  She did not have any pain when she woke up this morning, which is atypical for her.  Currently, pain in the right hip is 2/10, which is better than previously.  She is now getting some radiating pain down the lateral and posterior side of the leg and posterior into the foot.    Interval history:  4/9/24: Since her last visit, she saw Dr. Casandra Chris of pain management and underwent a right-sided femoral/obturator radiofrequency ablation.  Reports significant treatment in her right hip pain.  She no longer has any pain going down the leg.  She no longer feels like the hip is unstable.  She was noting some improvement in the pain prior to the procedure by Dr. Chris and is wondering which 1 may have been most beneficial to her.  Currently, she does not have any anterior hip or groin pain but does have some pain at the greater trochanteric region and buttock that is a dull ache and 1/10.  She is still taking gabapentin 300 mg at bedtime and Tylenol arthritis in the morning.    MEDS    Current Outpatient Medications:     albuterol 90 mcg/actuation inhaler, Inhale 2 puffs every 4 hours if needed for wheezing., Disp: 18 g, Rfl: 3    ergocalciferol (Vitamin D-2) 1.25 MG (22868 UT) capsule, Take 1 capsule (1,250 mcg) by mouth 1 (one) time per week., Disp: , Rfl:     eszopiclone (Lunesta) 2 mg tablet, Take 1 tablet (2 mg) by mouth as needed at bedtime for sleep., Disp: 90 tablet, Rfl: 0    fluticasone (Flonase) 50 mcg/actuation nasal spray, Administer 1 spray into each nostril 2 times a day. Shake gently. Before first use, prime pump. After use, clean tip and replace cap., Disp: , Rfl:      fluticasone-umeclidin-vilanter (TRELEGY-ELLIPTA) 100-62.5-25 mcg blister with device, Inhale 1 puff once daily., Disp: , Rfl:     gabapentin (Neurontin) 300 mg capsule, Take 1 capsule (300 mg) by mouth once daily at bedtime., Disp: 30 capsule, Rfl: 3    ibuprofen 800 mg tablet, TAKE 1 TABLET (800 MG) BY MOUTH 3 TIMES A DAY., Disp: 90 tablet, Rfl: 1    mv-mn/folic ac/calcium/vit K1 (WOMEN'S 50 PLUS MULTIVITAMIN ORAL), Take 1 tablet by mouth once daily., Disp: , Rfl:     omeprazole (PriLOSEC) 40 mg DR capsule, TAKE 1 CAPSULE BY MOUTH EVERY DAY, Disp: 90 capsule, Rfl: 0    ondansetron ODT (Zofran-ODT) 4 mg disintegrating tablet, Take 1 tablet (4 mg) by mouth every 8 hours if needed for nausea or vomiting for up to 3 doses., Disp: 3 tablet, Rfl: 0    rosuvastatin (Crestor) 10 mg tablet, Take 1 tablet (10 mg) by mouth once daily., Disp: 90 tablet, Rfl: 1    ALLERGIES  No Known Allergies    PAST MEDICAL HISTORY  Past Medical History:   Diagnosis Date    Personal history of other infectious and parasitic diseases     History of hepatitis C       PAST SURGICAL HISTORY  Past Surgical History:   Procedure Laterality Date    COLONOSCOPY  06/03/2013    Complete Colonoscopy    COLONOSCOPY  02/13/2014    Complete Colonoscopy    CT ANGIO CORONARY ART WITH HEARTFLOW IF SCORE >30%  7/9/2021    CT HEART CORONARY ANGIOGRAM 7/9/2021 [facility] [place]    ESOPHAGOGASTRODUODENOSCOPY  02/27/2014    Diagnostic Esophagogastroduodenoscopy    LAPAROSCOPY DIAGNOSTIC / BIOPSY / ASPIRATION / LYSIS  02/13/2014    Exploratory Laparoscopy    OTHER SURGICAL HISTORY  06/05/2013    Chest Tube Insertion       SOCIAL HISTORY   Social History     Socioeconomic History    Marital status: Single     Spouse name: Not on file    Number of children: Not on file    Years of education: Not on file    Highest education level: Not on file   Occupational History    Not on file   Tobacco Use    Smoking status: Every Day     Packs/day: .25     Types: Cigarettes     Smokeless tobacco: Former   Substance and Sexual Activity    Alcohol use: Never    Drug use: Never    Sexual activity: Not on file   Other Topics Concern    Not on file   Social History Narrative    Not on file     Social Determinants of Health     Financial Resource Strain: Not on file   Food Insecurity: Not on file   Transportation Needs: Not on file   Physical Activity: Not on file   Stress: Not on file   Social Connections: Not on file   Intimate Partner Violence: Not on file   Housing Stability: Not on file       FAMILY HISTORY  Family History   Problem Relation Name Age of Onset    Diabetes Mother      Hypertension Mother      Pneumonia Mother      Other (Ischemic heart disease) Mother      Lung cancer Father      Diabetes type I Father      Other (AIDS) Brother      Diabetes Brother      Hypertension Brother      Other (Stroke syndrome) Brother      Colon cancer Maternal Grandfather         REVIEW OF SYSTEMS  Except for those mentioned in the history of present illness, and below, a complete review of systems is negative.     Review of Systems    VITALS  There were no vitals filed for this visit.    PHYSICAL EXAMINATION   GENERAL: Awake, alert, and oriented, no apparent distress, pleasant, and cooperative  PSYC: Mood is euthymic, affect is congruent  EAR, NOSE, THROAT: Normocephalic, atraumatic, moist membranes, anicteric sclera  LUNG: Nonlabored breathing  HEART: No clubbing or cyanosis  SKIN: No increased erythema, warmth, rashes, or concerning skin lesions  NEURO: Sensation is intact in the bilateral upper and lower extremities. Strength is grossly 5 out of 5 throughout the bilateral upper and lower extremities, unless noted below.  Positive straight leg raise.  GAIT: Antalgic.  MSK: Examination of the right hip: Hip range of motion was limited in internal rotation to 25 degrees. Scour's and FAIR were positive. Stinchfield was negative. Log roll was negative. Maryana's was negative. Ganeslen´s and P4 are  negative.  Mild tenderness to palpation over the greater trochanteric region.  No tenderness palpation over ASIS, AIIS, adductor tendons, piriformis, ischial tuberosity. Sondra's was negative.     IMAGING STUDIES:   Radiographs of the right hip dated 12/2/2022 - right hip osteoarthritis and a calcification of the anterior superior labrum.      IMPRESSION  #1  Chronic right hip osteoarthritis  #2  Greater trochanteric pain syndrome    PLAN  The following was discussed with the patient:     Viola Berman is a very pleasant 68 y.o. female with history of COPD, hepatitis C, GERD, hyperlipidemia, DVT on Xarelto who presents with right hip pain due to acute exacerbation of chronic right hip osteoarthritis and referred buttock pain from lumbar facet arthrosis vs radiculopathy vs greater trochanteric pain syndrome.  -The diagnosis of hip arthritis was discussed in detail. The only cure for arthritis is a hip replacement. Without curing arthritis, we can improve the pain that the patient experiences and hopefully allow them to continue to do the activities that they enjoy.  -Physical therapy: Work on hip group and core strengthening and hip flexibility to maintain current ROM with PT. Continue with her physical therapy home exercise program.  -Weight loss and physical activity: The benefits of weight loss and physical activity on improving pain experienced with arthritis were discussed.  -Medications: Continue to take Tylenol over the counter.  She should try to stop the gabapentin 300mg to see if she notices a difference. She is almost out, so we did send a new script in case she notices a difference when she weans.  -Injections: Injections, such as corticosteroid and PRP, can be utilized. She underwent PRP/ACP injection on 11/14/23 and femoral/obturator nerve radiofrequency ablation by Dr. Chris on 2/26/2024.  We did discuss that if her symptoms return, she should try the radiofrequency ablation prior to the PRP, as this  may have been the most beneficial for her.  -We discussed that her buttock/lateral hip pain could be related to referred pain from lumbar facet arthrosis versus radiculopathy versus greater trochanteric pain syndrome.  She was encouraged to continue with her home exercise program and can follow-up with Dr. Chris for any lumbar spine related pain.  -Follow-up as needed.    The patient was counseled to remain active, but avoid activities that worsen symptoms. The patient was in agreement with this plan. All questions were answered to the best of my ability.    PATIENT EDUCATION:  Education was discussed at today's appointment. A learning needs assessment was performed.    Primary learner: Viola Berman  Barriers to learning: None  Preferred language: English  Learning preferences include: Seeing and doing.  Discussed: Diagnosis and treatment plan.  Demonstrated: Understanding of material discussed.  Patient education materials given: None.  Learner response: Learner demonstrated understanding.    This note was dictated using Dragon speech recognition software and was not corrected for spelling or grammatical errors.

## 2024-04-16 ENCOUNTER — OFFICE VISIT (OUTPATIENT)
Dept: PRIMARY CARE | Facility: CLINIC | Age: 69
End: 2024-04-16
Payer: COMMERCIAL

## 2024-04-16 ENCOUNTER — LAB (OUTPATIENT)
Dept: LAB | Facility: LAB | Age: 69
End: 2024-04-16
Payer: COMMERCIAL

## 2024-04-16 VITALS
HEIGHT: 65 IN | WEIGHT: 140 LBS | SYSTOLIC BLOOD PRESSURE: 118 MMHG | HEART RATE: 64 BPM | BODY MASS INDEX: 23.32 KG/M2 | DIASTOLIC BLOOD PRESSURE: 70 MMHG

## 2024-04-16 DIAGNOSIS — E55.9 VITAMIN D DEFICIENCY: ICD-10-CM

## 2024-04-16 DIAGNOSIS — R73.03 PREDIABETES: ICD-10-CM

## 2024-04-16 DIAGNOSIS — K21.9 GASTROESOPHAGEAL REFLUX DISEASE WITHOUT ESOPHAGITIS: ICD-10-CM

## 2024-04-16 DIAGNOSIS — Z00.00 ANNUAL PHYSICAL EXAM: Primary | ICD-10-CM

## 2024-04-16 DIAGNOSIS — E78.00 PURE HYPERCHOLESTEROLEMIA: ICD-10-CM

## 2024-04-16 DIAGNOSIS — Z01.419 ENCOUNTER FOR GYNECOLOGICAL EXAMINATION WITHOUT ABNORMAL FINDING: ICD-10-CM

## 2024-04-16 DIAGNOSIS — J44.1 CHRONIC OBSTRUCTIVE PULMONARY DISEASE WITH ACUTE EXACERBATION (MULTI): ICD-10-CM

## 2024-04-16 DIAGNOSIS — N28.1 RENAL CYST: ICD-10-CM

## 2024-04-16 DIAGNOSIS — Z72.0 TOBACCO USE: ICD-10-CM

## 2024-04-16 DIAGNOSIS — I47.29 NSVT (NONSUSTAINED VENTRICULAR TACHYCARDIA) (MULTI): ICD-10-CM

## 2024-04-16 DIAGNOSIS — G47.00 INSOMNIA, UNSPECIFIED TYPE: ICD-10-CM

## 2024-04-16 DIAGNOSIS — I10 BENIGN HYPERTENSION: ICD-10-CM

## 2024-04-16 PROBLEM — B18.2 CHRONIC HEPATITIS C (MULTI): Status: RESOLVED | Noted: 2023-02-23 | Resolved: 2024-04-16

## 2024-04-16 LAB
25(OH)D3 SERPL-MCNC: 19 NG/ML (ref 30–100)
ALBUMIN SERPL BCP-MCNC: 4.3 G/DL (ref 3.4–5)
ALP SERPL-CCNC: 90 U/L (ref 33–136)
ALT SERPL W P-5'-P-CCNC: 8 U/L (ref 7–45)
ANION GAP SERPL CALC-SCNC: 12 MMOL/L (ref 10–20)
AST SERPL W P-5'-P-CCNC: 13 U/L (ref 9–39)
BASOPHILS # BLD AUTO: 0.02 X10*3/UL (ref 0–0.1)
BASOPHILS NFR BLD AUTO: 0.3 %
BILIRUB SERPL-MCNC: 0.5 MG/DL (ref 0–1.2)
BUN SERPL-MCNC: 13 MG/DL (ref 6–23)
CALCIUM SERPL-MCNC: 9.4 MG/DL (ref 8.6–10.6)
CHLORIDE SERPL-SCNC: 107 MMOL/L (ref 98–107)
CHOLEST SERPL-MCNC: 137 MG/DL (ref 0–199)
CHOLESTEROL/HDL RATIO: 2.5
CO2 SERPL-SCNC: 27 MMOL/L (ref 21–32)
CREAT SERPL-MCNC: 0.73 MG/DL (ref 0.5–1.05)
EGFRCR SERPLBLD CKD-EPI 2021: 90 ML/MIN/1.73M*2
EOSINOPHIL # BLD AUTO: 0.03 X10*3/UL (ref 0–0.7)
EOSINOPHIL NFR BLD AUTO: 0.5 %
ERYTHROCYTE [DISTWIDTH] IN BLOOD BY AUTOMATED COUNT: 14 % (ref 11.5–14.5)
EST. AVERAGE GLUCOSE BLD GHB EST-MCNC: 108 MG/DL
GLUCOSE SERPL-MCNC: 95 MG/DL (ref 74–99)
HBA1C MFR BLD: 5.4 %
HCT VFR BLD AUTO: 41 % (ref 36–46)
HDLC SERPL-MCNC: 55.9 MG/DL
HGB BLD-MCNC: 13.5 G/DL (ref 12–16)
IMM GRANULOCYTES # BLD AUTO: 0.01 X10*3/UL (ref 0–0.7)
IMM GRANULOCYTES NFR BLD AUTO: 0.2 % (ref 0–0.9)
LDLC SERPL CALC-MCNC: 70 MG/DL
LYMPHOCYTES # BLD AUTO: 1.79 X10*3/UL (ref 1.2–4.8)
LYMPHOCYTES NFR BLD AUTO: 30.8 %
MCH RBC QN AUTO: 30.7 PG (ref 26–34)
MCHC RBC AUTO-ENTMCNC: 32.9 G/DL (ref 32–36)
MCV RBC AUTO: 93 FL (ref 80–100)
MONOCYTES # BLD AUTO: 0.36 X10*3/UL (ref 0.1–1)
MONOCYTES NFR BLD AUTO: 6.2 %
NEUTROPHILS # BLD AUTO: 3.61 X10*3/UL (ref 1.2–7.7)
NEUTROPHILS NFR BLD AUTO: 62 %
NON HDL CHOLESTEROL: 81 MG/DL (ref 0–149)
NRBC BLD-RTO: 0 /100 WBCS (ref 0–0)
PLATELET # BLD AUTO: 222 X10*3/UL (ref 150–450)
POTASSIUM SERPL-SCNC: 3.8 MMOL/L (ref 3.5–5.3)
PROT SERPL-MCNC: 7 G/DL (ref 6.4–8.2)
RBC # BLD AUTO: 4.4 X10*6/UL (ref 4–5.2)
SODIUM SERPL-SCNC: 142 MMOL/L (ref 136–145)
TRIGL SERPL-MCNC: 58 MG/DL (ref 0–149)
VLDL: 12 MG/DL (ref 0–40)
WBC # BLD AUTO: 5.8 X10*3/UL (ref 4.4–11.3)

## 2024-04-16 PROCEDURE — 99213 OFFICE O/P EST LOW 20 MIN: CPT | Performed by: NURSE PRACTITIONER

## 2024-04-16 PROCEDURE — 80061 LIPID PANEL: CPT

## 2024-04-16 PROCEDURE — 1160F RVW MEDS BY RX/DR IN RCRD: CPT | Performed by: NURSE PRACTITIONER

## 2024-04-16 PROCEDURE — 85025 COMPLETE CBC W/AUTO DIFF WBC: CPT

## 2024-04-16 PROCEDURE — 3078F DIAST BP <80 MM HG: CPT | Performed by: NURSE PRACTITIONER

## 2024-04-16 PROCEDURE — 88175 CYTOPATH C/V AUTO FLUID REDO: CPT

## 2024-04-16 PROCEDURE — 36415 COLL VENOUS BLD VENIPUNCTURE: CPT

## 2024-04-16 PROCEDURE — 82306 VITAMIN D 25 HYDROXY: CPT

## 2024-04-16 PROCEDURE — 1159F MED LIST DOCD IN RCRD: CPT | Performed by: NURSE PRACTITIONER

## 2024-04-16 PROCEDURE — 99397 PER PM REEVAL EST PAT 65+ YR: CPT | Performed by: NURSE PRACTITIONER

## 2024-04-16 PROCEDURE — 83036 HEMOGLOBIN GLYCOSYLATED A1C: CPT

## 2024-04-16 PROCEDURE — 80053 COMPREHEN METABOLIC PANEL: CPT

## 2024-04-16 PROCEDURE — 3074F SYST BP LT 130 MM HG: CPT | Performed by: NURSE PRACTITIONER

## 2024-04-16 PROCEDURE — 87624 HPV HI-RISK TYP POOLED RSLT: CPT

## 2024-04-16 RX ORDER — OMEPRAZOLE 40 MG/1
40 CAPSULE, DELAYED RELEASE ORAL DAILY
Qty: 90 CAPSULE | Refills: 1 | Status: SHIPPED | OUTPATIENT
Start: 2024-04-16

## 2024-04-16 RX ORDER — ROSUVASTATIN CALCIUM 10 MG/1
10 TABLET, COATED ORAL DAILY
Qty: 90 TABLET | Refills: 1 | Status: SHIPPED | OUTPATIENT
Start: 2024-04-16

## 2024-04-16 RX ORDER — ESZOPICLONE 2 MG/1
2 TABLET, FILM COATED ORAL NIGHTLY PRN
Qty: 90 TABLET | Refills: 0 | Status: SHIPPED | OUTPATIENT
Start: 2024-04-16

## 2024-04-16 NOTE — PROGRESS NOTES
Reason for Visit: Annual Physical Exam    HPI:Viola is a 68 year old female who presents to the office today for a physical exam. She has been feeling well. Had radiofrequency ablation to right thigh/hip. Thigh feels much better; less pain.     Stated Dr. Casandra Chris took her off of Gabapentin after having the radiofrequency ablation. She is requesting to restart Eszopiclone for insomnia.       Active Problem List  Patient Active Problem List   Diagnosis    Abnormal Pap smear of cervix    Acute deep vein thrombosis (DVT) of right lower extremity after procedure (Multi)    Acute pain of left ear    Acute pain of right ear    Acute pain of right shoulder    Acute respiratory infection    Acute sinusitis    ASCUS of cervix with negative high risk HPV    Atypical chest pain    Cervical radiculopathy    Cervicalgia    Cigarette nicotine dependence    Constipation    COPD (chronic obstructive pulmonary disease) (Multi)    Costochondritis    Cough    Cyst, kidney, acquired    Disc degeneration, lumbar    Dizziness    EVANS (dyspnea on exertion)    Dysfunction of right eustachian tube    Effusion of joint, lower leg    Epigastric discomfort    Facial scar    Fatty liver    Fluttering heart    Frequent headaches    GERD (gastroesophageal reflux disease)    Hay fever    Palpitations    Hordeolum internum of right upper eyelid    Hydradenitis    Hyperlipidemia    Infectious lymphocytosis    Insomnia    Joint pain, knee    Left shoulder pain    Left upper quadrant pain    Lower back pain    Lumbar radiculopathy    Mass of axilla    Nausea in adult    Neck pain    NSVT (nonsustained ventricular tachycardia) (Multi)    Pain in left axilla    Pain in right axilla    Pain of right breast    Pap smear abnormality of cervix with LGSIL    Paroxysmal SVT (supraventricular tachycardia) (CMS-HCC)    Primary localized osteoarthritis of knee    Primary osteoarthritis of right hip    Right hip pain    Right inguinal pain    Right knee  pain    Right-sided low back pain with sciatica    Shoulder pain    Sinus pressure    Sprain of left shoulder    Stiffness of right hip joint    Thigh DVT (deep venous thrombosis) (Multi)    Urinary frequency    Vaginal discomfort    Ventricular ectopics    Vitamin D deficiency       Comprehensive Medical/Surgical/Social/Family History  Past Medical History:   Diagnosis Date    Personal history of other infectious and parasitic diseases     History of hepatitis C     Past Surgical History:   Procedure Laterality Date    COLONOSCOPY  06/03/2013    Complete Colonoscopy    COLONOSCOPY  02/13/2014    Complete Colonoscopy    CT ANGIO CORONARY ART WITH HEARTFLOW IF SCORE >30%  7/9/2021    CT HEART CORONARY ANGIOGRAM 7/9/2021 [facility] [place] [facility]Y LEGACY    ESOPHAGOGASTRODUODENOSCOPY  02/27/2014    Diagnostic Esophagogastroduodenoscopy    LAPAROSCOPY DIAGNOSTIC / BIOPSY / ASPIRATION / LYSIS  02/13/2014    Exploratory Laparoscopy    OTHER SURGICAL HISTORY  06/05/2013    Chest Tube Insertion     Social History     Social History Narrative    Not on file         Allergies and Medications  Patient has no known allergies.  Current Outpatient Medications on File Prior to Visit   Medication Sig Dispense Refill    albuterol 90 mcg/actuation inhaler Inhale 2 puffs every 4 hours if needed for wheezing. 18 g 3    fluticasone (Flonase) 50 mcg/actuation nasal spray Administer 1 spray into each nostril 2 times a day. Shake gently. Before first use, prime pump. After use, clean tip and replace cap.      fluticasone-umeclidin-vilanter (TRELEGY-ELLIPTA) 100-62.5-25 mcg blister with device Inhale 1 puff once daily.      ibuprofen 800 mg tablet TAKE 1 TABLET (800 MG) BY MOUTH 3 TIMES A DAY. 90 tablet 1    mv-mn/folic ac/calcium/vit K1 (WOMEN'S 50 PLUS MULTIVITAMIN ORAL) Take 1 tablet by mouth once daily.      ondansetron ODT (Zofran-ODT) 4 mg disintegrating tablet Take 1 tablet (4 mg) by mouth every 8 hours if needed for nausea or vomiting for up to  "3 doses. 3 tablet 0    [DISCONTINUED] eszopiclone (Lunesta) 2 mg tablet Take 1 tablet (2 mg) by mouth as needed at bedtime for sleep. 90 tablet 0    [DISCONTINUED] gabapentin (Neurontin) 300 mg capsule Take 1 capsule (300 mg) by mouth once daily at bedtime. 30 capsule 3    [DISCONTINUED] omeprazole (PriLOSEC) 40 mg DR capsule TAKE 1 CAPSULE BY MOUTH EVERY DAY 90 capsule 0    [DISCONTINUED] rosuvastatin (Crestor) 10 mg tablet Take 1 tablet (10 mg) by mouth once daily. 90 tablet 1     No current facility-administered medications on file prior to visit.         ROS otherwise negative aside from what was mentioned above in HPI.    Vitals  /70   Pulse 64   Ht 1.651 m (5' 5\")   Wt 63.5 kg (140 lb)   BMI 23.30 kg/m²   Body mass index is 23.3 kg/m².  Physical Exam  Gen: Alert, NAD  HEENT:  PERRLA, EOMI, conjunctiva and sclera normal in appearance. External auditory canals/TMs normal; Oral cavity and posterior pharynx without lesions/exudate  Neck:  Supple with FROM; No masses/nodes palpable; Thyroid nontender and without nodules; No JG  Respiratory:  Lungs CTAB  Cardiovascular:  Heart RRR. No M/R/G. Peripheral pulses equal bilaterally  Abdomen:  Soft, nontender, BS present throughout; No R/G/R; No HSM or masses palpated  Extremities:  FROM all extremities; Muscle strength grossly normal with good tone  Neuro:  CN II-XII intact; Reflexes 2+/2+; Gross motor and sensory intact  Skin:  No suspicious lesions present  Breast: No masses, skin lesions or nipple discharges, no axillary lymphadenopathy    Assessment and Plan:  Problem List Items Addressed This Visit       COPD (chronic obstructive pulmonary disease) (Multi)    Current Assessment & Plan     Continue to monitor. Counseled on smoking cessation.         GERD (gastroesophageal reflux disease)    Relevant Medications    omeprazole (PriLOSEC) 40 mg DR capsule    Hyperlipidemia    Relevant Medications    rosuvastatin (Crestor) 10 mg tablet    Other Relevant Orders "    CBC and Auto Differential    Comprehensive Metabolic Panel    Lipid Panel    Insomnia    Relevant Medications    eszopiclone (Lunesta) 2 mg tablet    NSVT (nonsustained ventricular tachycardia) (Multi)    Current Assessment & Plan     Stable. Continue to monitor.         Vitamin D deficiency    Relevant Orders    Vitamin D 25-Hydroxy,Total (for eval of Vitamin D levels)     Other Visit Diagnoses       Annual physical exam    -  Primary    Encounter for gynecological examination without abnormal finding        Relevant Orders    THINPREP PAP TEST    Prediabetes        Relevant Orders    Hemoglobin A1C    Benign hypertension        Renal cyst        Relevant Orders    US renal complete    Tobacco use            1) Hyperlipidemia: lipids ordered. Watch foods high in cholesterol (fried foods, fast food, processed meats, desserts such as cookies, cake, ice cream, pastries and other sweets). Some foods that are high in cholesterol are healthy additions to your diet (eggs-4/week, cheese, shellfish, pasture raised steak, organ meats such as heart, kidney and liver, sardines and full-fat yogurt). Continue Rosuvastatin.    2) Insomnia: instructed to avoid caffeine at bedtime along with computer work or watching TV in bed. Avoid large meals before bed. Keep sleep schedule. Warm shower or massage before bedtime may help with sleep along with reading, soft music, breathing exercises or yoga. Recommend you get out of bed if not sleeping. Prescription refilled for Eszopiclone.     3) Renal cysts on renal ultrasound: renal ultrasound ordered to evaluate renal cysts.    4) Abnormal pap: pap repeated today.     5) Prediabetes: A1c ordered. Counseled on watching daily carbohydrates (portion control) such as breads, pasta, rice, starchy vegetables and sweets.    6) Nicotine use: Counseled on options of smoking cessation with prescription medications (Chantix and Bupropion). Also discussed Nicotine patches, gum, lozenge and hypnotic  therapy. You are interested in hypnosis. I discussed with you that [facility] has hypnosis program.  > 3 minutes spent on smoking cessation.

## 2024-04-18 DIAGNOSIS — E55.9 VITAMIN D DEFICIENCY: Primary | ICD-10-CM

## 2024-04-18 RX ORDER — ERGOCALCIFEROL 1.25 MG/1
50000 CAPSULE ORAL
Qty: 12 CAPSULE | Refills: 1 | Status: SHIPPED | OUTPATIENT
Start: 2024-04-21 | End: 2024-10-06

## 2024-05-07 ENCOUNTER — HOSPITAL ENCOUNTER (OUTPATIENT)
Dept: RADIOLOGY | Facility: HOSPITAL | Age: 69
Discharge: HOME | End: 2024-05-07
Payer: COMMERCIAL

## 2024-05-07 DIAGNOSIS — N28.1 RENAL CYST: ICD-10-CM

## 2024-05-07 PROCEDURE — 76770 US EXAM ABDO BACK WALL COMP: CPT

## 2024-05-07 PROCEDURE — 76770 US EXAM ABDO BACK WALL COMP: CPT | Performed by: RADIOLOGY

## 2024-05-10 DIAGNOSIS — N28.1 RENAL CYST, RIGHT: Primary | ICD-10-CM

## 2024-05-10 DIAGNOSIS — R10.9 RIGHT FLANK PAIN: ICD-10-CM

## 2024-05-10 DIAGNOSIS — T75.3XXD MOTION SICKNESS, SUBSEQUENT ENCOUNTER: ICD-10-CM

## 2024-05-10 RX ORDER — SCOLOPAMINE TRANSDERMAL SYSTEM 1 MG/1
1 PATCH, EXTENDED RELEASE TRANSDERMAL
Qty: 4 PATCH | Refills: 0 | Status: SHIPPED | OUTPATIENT
Start: 2024-05-10

## 2024-05-11 ENCOUNTER — TELEPHONE (OUTPATIENT)
Dept: PRIMARY CARE | Facility: CLINIC | Age: 69
End: 2024-05-11
Payer: COMMERCIAL

## 2024-05-12 NOTE — TELEPHONE ENCOUNTER
Spoke with Viola; having flank pain. Will order dedicated CT of kidneys since renal ultrasound showed slight enlargement of complex right renal cyst.

## 2024-05-16 ENCOUNTER — TELEPHONE (OUTPATIENT)
Dept: PRIMARY CARE | Facility: CLINIC | Age: 69
End: 2024-05-16
Payer: COMMERCIAL

## 2024-05-16 DIAGNOSIS — N28.1 RENAL CYST: Primary | ICD-10-CM

## 2024-05-16 NOTE — TELEPHONE ENCOUNTER
----- Message from Viola Berman sent at 5/15/2024  5:40 PM EDT -----  Regarding: Lab for renal ct  Contact: [phone]  The actual instructions say THAT I need a  creatinine/eGFR drawn.

## 2024-05-16 NOTE — TELEPHONE ENCOUNTER
Please advise     Does pt need pre surgical clearance or okay to add creatinine?     Pt has upcoming surgery  6/10/24

## 2024-06-04 ENCOUNTER — LAB (OUTPATIENT)
Dept: LAB | Facility: LAB | Age: 69
End: 2024-06-04
Payer: COMMERCIAL

## 2024-06-04 DIAGNOSIS — N28.1 RENAL CYST: ICD-10-CM

## 2024-06-04 LAB
BUN SERPL-MCNC: 17 MG/DL (ref 6–23)
CREAT SERPL-MCNC: 0.81 MG/DL (ref 0.5–1.05)
EGFRCR SERPLBLD CKD-EPI 2021: 79 ML/MIN/1.73M*2

## 2024-06-04 PROCEDURE — 82565 ASSAY OF CREATININE: CPT

## 2024-06-04 PROCEDURE — 36415 COLL VENOUS BLD VENIPUNCTURE: CPT

## 2024-06-04 PROCEDURE — 84520 ASSAY OF UREA NITROGEN: CPT

## 2024-06-10 ENCOUNTER — HOSPITAL ENCOUNTER (OUTPATIENT)
Dept: RADIOLOGY | Facility: HOSPITAL | Age: 69
Discharge: HOME | End: 2024-06-10
Payer: COMMERCIAL

## 2024-06-10 DIAGNOSIS — N28.1 RENAL CYST, RIGHT: ICD-10-CM

## 2024-06-10 DIAGNOSIS — R10.9 RIGHT FLANK PAIN: ICD-10-CM

## 2024-06-10 PROCEDURE — 2550000001 HC RX 255 CONTRASTS: Performed by: NURSE PRACTITIONER

## 2024-06-10 PROCEDURE — 74160 CT ABDOMEN W/CONTRAST: CPT | Performed by: RADIOLOGY

## 2024-06-10 PROCEDURE — 74160 CT ABDOMEN W/CONTRAST: CPT

## 2024-06-10 RX ADMIN — IOHEXOL 75 ML: 350 INJECTION, SOLUTION INTRAVENOUS at 08:22

## 2024-06-14 DIAGNOSIS — R06.02 SHORTNESS OF BREATH: ICD-10-CM

## 2024-06-14 DIAGNOSIS — N28.1 RENAL CYST: ICD-10-CM

## 2024-06-25 ENCOUNTER — TELEMEDICINE (OUTPATIENT)
Dept: PRIMARY CARE | Facility: CLINIC | Age: 69
End: 2024-06-25
Payer: COMMERCIAL

## 2024-06-25 VITALS — WEIGHT: 140 LBS | HEIGHT: 65 IN | BODY MASS INDEX: 23.32 KG/M2

## 2024-06-25 DIAGNOSIS — J22 ACUTE RESPIRATORY INFECTION: ICD-10-CM

## 2024-06-25 DIAGNOSIS — R11.2 NAUSEA AND VOMITING, UNSPECIFIED VOMITING TYPE: Primary | ICD-10-CM

## 2024-06-25 PROCEDURE — 1159F MED LIST DOCD IN RCRD: CPT | Performed by: NURSE PRACTITIONER

## 2024-06-25 PROCEDURE — 99214 OFFICE O/P EST MOD 30 MIN: CPT | Performed by: NURSE PRACTITIONER

## 2024-06-25 RX ORDER — CEFDINIR 300 MG/1
300 CAPSULE ORAL 2 TIMES DAILY
Qty: 14 CAPSULE | Refills: 0 | Status: SHIPPED | OUTPATIENT
Start: 2024-06-25 | End: 2024-07-02

## 2024-06-25 RX ORDER — ONDANSETRON 4 MG/1
4 TABLET, ORALLY DISINTEGRATING ORAL EVERY 8 HOURS PRN
Qty: 10 TABLET | Refills: 0 | Status: SHIPPED | OUTPATIENT
Start: 2024-06-25

## 2024-06-25 ASSESSMENT — PATIENT HEALTH QUESTIONNAIRE - PHQ9
2. FEELING DOWN, DEPRESSED OR HOPELESS: NOT AT ALL
SUM OF ALL RESPONSES TO PHQ9 QUESTIONS 1 AND 2: 0
1. LITTLE INTEREST OR PLEASURE IN DOING THINGS: NOT AT ALL

## 2024-06-25 ASSESSMENT — ENCOUNTER SYMPTOMS
LOSS OF SENSATION IN FEET: 0
DEPRESSION: 0
OCCASIONAL FEELINGS OF UNSTEADINESS: 0

## 2024-06-25 NOTE — PROGRESS NOTES
"Subjective   Patient ID: Viola Berman is a 68 y.o. female who presents for covid  (Came back negative but thinks she has a viral infection).    Viola is being evaluated via telehealth visit today. She has been having respiratory symptoms. She just returned from a cruise on Sunday. Has headache, cough, runny nose, right earache, nausea and body aches. She denied fever or chills. Did do a Covid test; result negative.              Review of Systems   Constitutional:  Negative for chills and fever.   HENT:  Positive for ear pain and rhinorrhea.    Respiratory:  Positive for cough. Negative for shortness of breath.    Gastrointestinal:  Positive for nausea.   Musculoskeletal:  Positive for myalgias.   Neurological:  Positive for headaches.   All other systems reviewed and are negative.      Objective   Ht 1.651 m (5' 5\")   Wt 63.5 kg (140 lb)   BMI 23.30 kg/m²     Physical Exam  Constitutional:       Appearance: She is ill-appearing.   Neurological:      Mental Status: She is alert and oriented to person, place, and time.   Psychiatric:         Mood and Affect: Mood normal.         Behavior: Behavior normal.         Thought Content: Thought content normal.         Judgment: Judgment normal.       Assessment/Plan   Problem List Items Addressed This Visit             ICD-10-CM    Acute respiratory infection J22    Relevant Medications    cefdinir (Omnicef) 300 mg capsule     Other Visit Diagnoses         Codes    Nausea and vomiting, unspecified vomiting type    -  Primary R11.2    Relevant Medications    ondansetron ODT (Zofran-ODT) 4 mg disintegrating tablet          1) Acute respiratory infection: you were prescribed Cefdinir. Make sure you are staying well hydrated with fluids. Make take Tylenol Extra Strength for body aches. Follow-up if no improvement or if symptoms worsen. Work note provided for you to be off for 2 days.    2) Nausea: you were prescribed Ondansetron. Make sure you are staying well hydrated with " fluids.     This visit was completed via telephone/virtual visit. All issues as documented below were discussed and addressed but no physical exam was performed. If it was felt that the patient should be evaluated via  face-to-face office appointment(s) they were directed there. The patient verbally consented to this visit. >11 minutes spent on telehealth visit including coordinating care.

## 2024-06-27 ASSESSMENT — ENCOUNTER SYMPTOMS
COUGH: 1
NAUSEA: 1
HEADACHES: 1
FEVER: 0
CHILLS: 0
SHORTNESS OF BREATH: 0
MYALGIAS: 1
RHINORRHEA: 1

## 2024-06-28 ENCOUNTER — APPOINTMENT (OUTPATIENT)
Dept: RADIOLOGY | Facility: HOSPITAL | Age: 69
End: 2024-06-28
Payer: COMMERCIAL

## 2024-06-28 ENCOUNTER — HOSPITAL ENCOUNTER (EMERGENCY)
Facility: HOSPITAL | Age: 69
Discharge: HOME | End: 2024-06-28
Payer: COMMERCIAL

## 2024-06-28 VITALS
RESPIRATION RATE: 18 BRPM | SYSTOLIC BLOOD PRESSURE: 153 MMHG | BODY MASS INDEX: 23.32 KG/M2 | DIASTOLIC BLOOD PRESSURE: 74 MMHG | TEMPERATURE: 97.3 F | WEIGHT: 140 LBS | OXYGEN SATURATION: 100 % | HEIGHT: 65 IN | HEART RATE: 90 BPM

## 2024-06-28 DIAGNOSIS — S32.401A CLOSED NONDISPLACED FRACTURE OF RIGHT ACETABULUM, UNSPECIFIED PORTION OF ACETABULUM, INITIAL ENCOUNTER (MULTI): Primary | ICD-10-CM

## 2024-06-28 PROCEDURE — 73700 CT LOWER EXTREMITY W/O DYE: CPT | Mod: RIGHT SIDE | Performed by: RADIOLOGY

## 2024-06-28 PROCEDURE — 73700 CT LOWER EXTREMITY W/O DYE: CPT | Mod: RT

## 2024-06-28 PROCEDURE — 73502 X-RAY EXAM HIP UNI 2-3 VIEWS: CPT | Mod: RIGHT SIDE | Performed by: RADIOLOGY

## 2024-06-28 PROCEDURE — 73502 X-RAY EXAM HIP UNI 2-3 VIEWS: CPT | Mod: RT

## 2024-06-28 PROCEDURE — 99284 EMERGENCY DEPT VISIT MOD MDM: CPT

## 2024-06-28 RX ORDER — NAPROXEN 500 MG/1
500 TABLET ORAL
Qty: 20 TABLET | Refills: 0 | Status: SHIPPED | OUTPATIENT
Start: 2024-06-28 | End: 2024-07-08

## 2024-06-28 RX ORDER — TIZANIDINE 2 MG/1
4 TABLET ORAL 3 TIMES DAILY PRN
Qty: 180 TABLET | Refills: 0 | Status: SHIPPED | OUTPATIENT
Start: 2024-06-28 | End: 2024-07-28

## 2024-06-28 RX ORDER — ACETAMINOPHEN 500 MG
1000 TABLET ORAL 2 TIMES DAILY
Qty: 90 TABLET | Refills: 0 | Status: SHIPPED | OUTPATIENT
Start: 2024-06-28 | End: 2024-07-13

## 2024-06-28 ASSESSMENT — PAIN DESCRIPTION - LOCATION: LOCATION: HIP

## 2024-06-28 ASSESSMENT — COLUMBIA-SUICIDE SEVERITY RATING SCALE - C-SSRS
2. HAVE YOU ACTUALLY HAD ANY THOUGHTS OF KILLING YOURSELF?: NO
6. HAVE YOU EVER DONE ANYTHING, STARTED TO DO ANYTHING, OR PREPARED TO DO ANYTHING TO END YOUR LIFE?: NO
1. IN THE PAST MONTH, HAVE YOU WISHED YOU WERE DEAD OR WISHED YOU COULD GO TO SLEEP AND NOT WAKE UP?: NO

## 2024-06-28 ASSESSMENT — PAIN - FUNCTIONAL ASSESSMENT: PAIN_FUNCTIONAL_ASSESSMENT: 0-10

## 2024-06-28 ASSESSMENT — PAIN SCALES - GENERAL: PAINLEVEL_OUTOF10: 10 - WORST POSSIBLE PAIN

## 2024-06-28 ASSESSMENT — PAIN DESCRIPTION - ORIENTATION: ORIENTATION: RIGHT

## 2024-06-28 NOTE — ED PROVIDER NOTES
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  She endorses 10 out of 10 right hip pain and she took an acetaminophen.  She does not want any other medication for hip pain at this time.  She rates her headache 5 out of 10 and it is actually at her forehead where the door struck her in the head.  She is denying nausea or vomiting.  She is denying vision change.  She is denying posterior neck pain.  She is denying chest pain, dyspnea, abdominal pain, nausea or vomiting.  She is denying any neurologic deficit.  She cannot lift her right leg without severe pain.      History provided by:  Patient   used: No                        [place] Coma Scale Score: 15                     Patient History   Past Medical History:   Diagnosis Date    Personal history of other infectious and parasitic diseases     History of hepatitis C     Past Surgical History:   Procedure Laterality Date    COLONOSCOPY  06/03/2013    Complete Colonoscopy    COLONOSCOPY  02/13/2014    Complete Colonoscopy    CT ANGIO CORONARY ART WITH HEARTFLOW IF SCORE >30%  7/9/2021    CT HEART CORONARY ANGIOGRAM 7/9/2021 [facility] [place] [facility] EMERGENCY LEGACY    ESOPHAGOGASTRODUODENOSCOPY  02/27/2014    Diagnostic Esophagogastroduodenoscopy    LAPAROSCOPY DIAGNOSTIC / BIOPSY / ASPIRATION / LYSIS  02/13/2014    Exploratory Laparoscopy    OTHER SURGICAL HISTORY  06/05/2013    Chest Tube Insertion     Family History   Problem Relation Name Age of Onset    Diabetes Mother      Hypertension Mother      Pneumonia Mother      Other (Ischemic heart disease) Mother      Lung cancer Father      Diabetes type I Father      Other (AIDS) Brother      Diabetes Brother      Hypertension Brother      Other (Stroke syndrome) Brother       Colon cancer Maternal Grandfather       Social History     Tobacco Use    Smoking status: Every Day     Current packs/day: 0.25     Types: Cigarettes     Passive exposure: Never    Smokeless tobacco: Former   Substance Use Topics    Alcohol use: Never    Drug use: Never       Physical Exam   ED Triage Vitals [06/28/24 1842]   Temperature Heart Rate Respirations BP   36.3 °C (97.3 °F) 90 18 153/74      Pulse Ox Temp Source Heart Rate Source Patient Position   100 % Temporal -- --      BP Location FiO2 (%)     -- --       Physical Exam  Constitutional:       Appearance: Normal appearance.   HENT:      Head: Normocephalic and atraumatic.      Right Ear: Tympanic membrane normal.      Nose: Nose normal.      Mouth/Throat:      Mouth: Mucous membranes are dry.   Eyes:      Extraocular Movements: Extraocular movements intact.      Pupils: Pupils are equal, round, and reactive to light.   Cardiovascular:      Rate and Rhythm: Normal rate and regular rhythm.      Pulses: Normal pulses.      Heart sounds: Normal heart sounds.   Pulmonary:      Effort: Pulmonary effort is normal.      Breath sounds: Normal breath sounds.   Abdominal:      General: Abdomen is flat.      Palpations: Abdomen is soft.   Musculoskeletal:         General: Tenderness present.      Cervical back: Normal range of motion and neck supple.   Skin:     General: Skin is warm.      Capillary Refill: Capillary refill takes less than 2 seconds.   Neurological:      General: No focal deficit present.      Mental Status: She is alert and oriented to person, place, and time.      Cranial Nerves: No cranial nerve deficit.      Sensory: No sensory deficit.      Motor: No weakness.      Coordination: Coordination normal.      Gait: Gait normal.      Deep Tendon Reflexes: Reflexes normal.   Psychiatric:         Mood and Affect: Mood normal.         ED Course & MDM   Diagnoses as of 06/28/24 2132   Closed nondisplaced fracture of right acetabulum, unspecified  portion of acetabulum, initial encounter (Multi)       Medical Decision Making  There is no acute appearing fracture of the right hip.  There was mild fragmentation of the anterior superior acetabulum with well-corticated fragments which may be secondary to previous rim fracture or possibly femoral acetabular impingement associated with acetabular.  Nondisplaced possible fracture line through the rim of the osteophyte posterior inferior right acetabulum age is also indeterminate but without definite acute features.  There was moderate osteoarthritic change in the right hip and acetabular over coverage which can be seen with pincer type femoral acetabular impingement.  There is subchondral cysts present most pronounced within the femoral head no suspicious osseous lesion.  Patient was placed on crutches.  I requested appointment with orthopedic surgery.  She will use tizanidine and Naprosyn to manage her pain.  Safely discharged home with return precautions.  She is to remain nonweightbearing on the right leg until she is cleared by orthopedic surgery.    Amount and/or Complexity of Data Reviewed  Radiology: ordered and independent interpretation performed.        Procedure  Procedures     Javier Boyer, CATA-CNP  06/28/24 7843

## 2024-06-28 NOTE — Clinical Note
Viola Berman was seen and treated in our emergency department on 6/28/2024.  She may return to work on 07/05/2024.       If you have any questions or concerns, please don't hesitate to call.      Javier Boyer, CATA-CNP

## 2024-06-28 NOTE — ED TRIAGE NOTES
My triage note this patient was seen in triage.     Vitals are noted.      HPI:  Patient is a 68-year-old female presenting to the emergency department for evaluation for potential hip fracture, states that she was at work and someone accidentally hit her with the door and she fell down onto the right hip.  [facility] and was called this evening and told that she may have a hairline fracture of her acetabulum.  Is able to ambulate, has pain to the right hip that radiates into the right groin.  Denies any back pain, denies any headaches, neck pain, loss of consciousness.  Does not take any anticoagulants.    Focused PE:  Gen: Well-appearing, not in acute distress  Cardiovascular: Regular rate, normal rhythm, no murmur, no gallop  Respiratory: No adventitious lung sounds auscultated.  Abdomen: No reproducible abdominal tenderness upon palpation,  physical exam may be limited by patient positioning sitting up in a chair  Neuro:  Alert and Oriented, speech clear and coherent     Plan:  X-ray        For the remainder of the patient's workup and ED course, please see the main ED provider note.  We discussed need for diagnostic testing including lab studies and imaging.  We also discussed that they may be asked to wait in the waiting room while these test are pending.  They understand that if they choose to leave without having the testing completed or resulted that we cannot rule out acute life-threatening illnesses and the risks involved to lead to worsening condition, permanent disability or even death.

## 2024-07-01 ENCOUNTER — OFFICE VISIT (OUTPATIENT)
Dept: ORTHOPEDIC SURGERY | Facility: HOSPITAL | Age: 69
End: 2024-07-01
Payer: COMMERCIAL

## 2024-07-01 ENCOUNTER — HOSPITAL ENCOUNTER (OUTPATIENT)
Dept: RADIOLOGY | Facility: HOSPITAL | Age: 69
Discharge: HOME | End: 2024-07-01
Payer: COMMERCIAL

## 2024-07-01 DIAGNOSIS — M16.11 PRIMARY OSTEOARTHRITIS OF RIGHT HIP: Primary | ICD-10-CM

## 2024-07-01 DIAGNOSIS — M16.11 PRIMARY OSTEOARTHRITIS OF RIGHT HIP: ICD-10-CM

## 2024-07-01 DIAGNOSIS — S32.423A: ICD-10-CM

## 2024-07-01 PROCEDURE — 99214 OFFICE O/P EST MOD 30 MIN: CPT | Performed by: STUDENT IN AN ORGANIZED HEALTH CARE EDUCATION/TRAINING PROGRAM

## 2024-07-01 PROCEDURE — 99204 OFFICE O/P NEW MOD 45 MIN: CPT | Performed by: STUDENT IN AN ORGANIZED HEALTH CARE EDUCATION/TRAINING PROGRAM

## 2024-07-01 PROCEDURE — 1159F MED LIST DOCD IN RCRD: CPT | Performed by: STUDENT IN AN ORGANIZED HEALTH CARE EDUCATION/TRAINING PROGRAM

## 2024-07-01 PROCEDURE — 72170 X-RAY EXAM OF PELVIS: CPT | Performed by: RADIOLOGY

## 2024-07-01 PROCEDURE — 72170 X-RAY EXAM OF PELVIS: CPT

## 2024-07-01 PROCEDURE — 1125F AMNT PAIN NOTED PAIN PRSNT: CPT | Performed by: STUDENT IN AN ORGANIZED HEALTH CARE EDUCATION/TRAINING PROGRAM

## 2024-07-01 ASSESSMENT — PAIN DESCRIPTION - DESCRIPTORS: DESCRIPTORS: ACHING;THROBBING

## 2024-07-01 ASSESSMENT — PAIN - FUNCTIONAL ASSESSMENT: PAIN_FUNCTIONAL_ASSESSMENT: 0-10

## 2024-07-01 ASSESSMENT — PAIN SCALES - GENERAL: PAINLEVEL_OUTOF10: 9

## 2024-07-01 NOTE — LETTER
July 1, 2024     Patient: Viola Berman   YOB: 1955   Date of Visit: 7/1/2024       To Whom It May Concern:    It is my medical opinion that Viola Berman should remain out of work until for the next 2 weeks .    If you have any questions or concerns, please don't hesitate to call.         Sincerely,        Belinda Bowens MD    CC: No Recipients

## 2024-07-01 NOTE — PROGRESS NOTES
Belinda Bowens MD   Adult Reconstruction and Joint Replacement Surgery  Phone: [phone]     Fax: [phone]     INITIAL CONSULTATION      Date of Visit:  7/1/2024    CC: Right hip pain    HPI:  Viola Berman is a 68 y.o. female who presents with 1 months of acutely worse RIGHT hip pain, on chronic history of several years of right hip pain. They were referred by ED at [facility]. She has longstanding history of right hip pain and known arthritis. She also reports this was related to a fall.    Patient has tried the following Ice, NSAIDs, Tylenol (arthritis dosing) , Activity modification, Physical therapy, Corticosteroid injections , Hyaluronic acid injections, and Xray. Date of last steroid injection: >3 months. Patient does have pain at night. Patient is able to walk 2-3 blocks. Patient is currently using crutches as assistive device. Primarily complains of groin, buttock, and lateral hip pain. Patient has difficulty with donning and doffing shoes and socks, stairs, and getting in/out of car . The pain is significantly impacting her ability to perform activities of daily living. Patient reports no longer able to do activities such as walk without pain, sleep.     Focused History  PMH: Reviewed and PE/DVT: DVT after cardiac ablation. NSVT, pSVT, prior cardiac ablation. No longer on anticoagulation. COPD.  PSH: Reviewed , Hip/Knee replacement: no, Hip/Knee surgery: no, Anesthesia complications: no, Spine surgery: no, Surgical infection: no, and Weight loss surgery: no  Meds: Reviewed, Current Anticoagulants: no, Weight loss medication: no, and Current Opioids: no  Allergies: Reviewed  and The patient reports no contraindications or allergies to cephalosporins, aspirin, NSAIDs or opioids, except as noted above.  FH: No family history of any bleeding or clotting disorders.  SHx: Reviewed, Occupation: nurse for 45 years, Current smoker: 0.5 ppd, EtOH intake weekly: none, Social support: daughter, and Preferred  physical activities: traveling, walking  Dental Hx: Last routine cleaning: last year, dentures and Discussed that all invasive dental work must be completed at least 3 months prior to joint replacement surgery. Patient understands they are to avoid any invasive dental work 3-6 months post-surgically.   Yazdanism: no    PROMs   No questionnaires on file.     Past Medical History:   Diagnosis Date    Personal history of other infectious and parasitic diseases     History of hepatitis C       Past Medical History:   Diagnosis Date    Personal history of other infectious and parasitic diseases     History of hepatitis C     Documented in chart and reviewed.     Past Surgical History:   Procedure Laterality Date    COLONOSCOPY  06/03/2013    Complete Colonoscopy    COLONOSCOPY  02/13/2014    Complete Colonoscopy    CT ANGIO CORONARY ART WITH HEARTFLOW IF SCORE >30%  7/9/2021    CT HEART CORONARY ANGIOGRAM 7/9/2021 [facility] EMERGENCY LEGACY    ESOPHAGOGASTRODUODENOSCOPY  02/27/2014    Diagnostic Esophagogastroduodenoscopy    LAPAROSCOPY DIAGNOSTIC / BIOPSY / ASPIRATION / LYSIS  02/13/2014    Exploratory Laparoscopy    OTHER SURGICAL HISTORY  06/05/2013    Chest Tube Insertion       Allergies: She has No Known Allergies.     Medications:  Current Outpatient Medications   Medication Instructions    acetaminophen (TYLENOL) 1,000 mg, oral, 2 times daily    albuterol 90 mcg/actuation inhaler 2 puffs, inhalation, Every 4 hours PRN    celecoxib (CELEBREX) 200 mg, oral, 2 times daily    ergocalciferol (VITAMIN D-2) 50,000 Units, oral, Once Weekly    eszopiclone (LUNESTA) 2 mg, oral, Nightly PRN    fluticasone-umeclidin-vilanter (TRELEGY-ELLIPTA) 100-62.5-25 mcg blister with device 1 puff, inhalation, Daily RT    mv-mn/folic ac/calcium/vit K1 (WOMEN'S 50 PLUS MULTIVITAMIN ORAL) 1 tablet, oral, Daily    naproxen (NAPROSYN) 500 mg, oral, 2 times daily (morning and late afternoon)    omeprazole (PRILOSEC) 40 mg, oral, Daily     ondansetron ODT (ZOFRAN-ODT) 4 mg, oral, Every 8 hours PRN    rosuvastatin (CRESTOR) 10 mg, oral, Daily    scopolamine (Transderm-Scop) 1 mg over 3 days patch 3 day 1 patch, transdermal, Every 72 hours PRN    tiZANidine (ZANAFLEX) 4 mg, oral, 3 times daily PRN       Family History   Problem Relation Name Age of Onset    Diabetes Mother      Hypertension Mother      Pneumonia Mother      Other (Ischemic heart disease) Mother      Lung cancer Father      Diabetes type I Father      Other (AIDS) Brother      Diabetes Brother      Hypertension Brother      Other (Stroke syndrome) Brother      Colon cancer Maternal Grandfather       Documented in chart and reviewed.     Social History     Tobacco Use    Smoking status: Every Day     Current packs/day: 0.25     Types: Cigarettes     Passive exposure: Never    Smokeless tobacco: Former   Substance Use Topics    Alcohol use: Never       Review of Systems: Review of systems completed with medical assistant intake. Please refer to this note.     Falls: The patient denies any recent falls or fall-related injuries.    Physical Exam:  BMI: 23, which is normal.     Constitutional: The patient is well-appearing and well groomed.     Neurological/Psychiatric: The patient is alert and oriented to person, place and time. The patient has a normal mood and affect.    Skin Examination: The skin over the right lower extremity, left lower extremity, right upper extremity, and left upper extremity is intact without any evidence of infection or rash.    Cardiovascular Examination: There are no varicosities and the skin is normal temperature, capillary refill normal, arterial pulses normal, no edema.    Lymphatic Examination: There is no lymphatic swelling or palpable lymph nodes present around the involved joint.    Neurological Examination: Bilateral lower extremities are grossly neurologically intact. Sensation normal, motor function normal.    Gait: The patient ambulates with a coxalgic  gait.     Lumbar spine:    No tenderness to palpation midline.    Negative straight leg raise bilaterally.    Right Hip Examination:  Gait: Coxalgic gait.    Examination of the hip reveals the skin to be intact.    There is mild tenderness over the greater trochanter.    There is no obvious swelling.    There is a positive Stinchfield test.    Range of motion is: full extension to 90 degrees of flexion.    The hip internally rotates to 10 degrees and externally rotates to 30 degrees.    Abduction is 30 degrees and adduction is 10 degrees.    There is groin and buttock pain with hip motion.    There is a negative straight leg raise.    Left Hip Examination:  Examination of the hip reveals the skin to be intact.    There is no tenderness over the greater trochanter.    There is no obvious swelling.    There is a negative Stinchfield test.    Range of motion is full extension to 100 degrees of flexion.    The hip internally rotates to 20 and externally rotates 40 degrees.    Abduction is 50 degrees and adduction is 20 degrees.    There is no groin and buttock pain with hip motion.    There is a negative straight leg raise.    Right Knee Examination:  Examination of the right knee reveals the skin to be intact. There is no obvious swelling.    There is no tenderness to palpation.    Range of motion is full extension to 120 degrees of flexion.    The knee is stable.    There is no grinding with range of motion.    There is no patellofemoral crepitus.    Left Knee Examination:  Examination of the left knee reveals the skin to be intact. There is no obvious swelling.    There is no tenderness to palpation.    Range of motion is full extension to 120 degrees of flexion.    The knee is stable.    There is no grinding with range of motion.    There is no patellofemoral crepitus.    Prior Labs:   Lab Results   Component Value Date    WBC 5.8 04/16/2024    HGB 13.5 04/16/2024    HCT 41.0 04/16/2024    MCV 93 04/16/2024    PLT  "222 04/16/2024      Lab Results   Component Value Date    INR 1.0 08/02/2022    PROTIME 11.7 08/02/2022         Lab Results   Component Value Date    GLUCOSE 95 04/16/2024    CALCIUM 9.4 04/16/2024     04/16/2024    K 3.8 04/16/2024    CO2 27 04/16/2024     04/16/2024    BUN 17 06/04/2024    CREATININE 0.81 06/04/2024      Lab Results   Component Value Date    CKTOTAL 65 09/18/2023    TROPONINI <0.02 07/08/2021      Lab Results   Component Value Date    HGBA1C 5.4 04/16/2024         No results found for: \"CRP\"   No results found for: \"SEDRATE\"     Radiographs:  Radiographs were personally reviewed today with the patient. There is evidence of moderate RIGHT  hip osteoarthritis without bone on bone apposition.  There is also a nondisplaced fracture of the posterior wall rim osteophyte of the acetabulum.    CT pelvis dated 6/28/2024 was reviewed.  Prior pincer fracture with other findings of femoral acetabular impingement.  Nondisplaced fracture line through posterior inferior rim osteophyte of the acetabulum.    Impression:  68 y.o. year old female presents with moderate RIGHT  hip osteoarthritis without bone on bone apposition with  recent acute onset of pain following a fall with plain films and CT scan demonstrating likely fractured osteophyte in the setting of moderate arthritis and femoral acetabular impingement. The patient is not a candidate for surgery at this time.    Diagnosis:   Primary osteoarthritis of right hip    Closed fracture of posterior wall of acetabulum, initial encounter (Multi)    Recommendations / Plan:    I have discussed the options in detail with the patient. We have discussed anti-inflammatory medication, activity modification, physical therapy, corticosteroid injections, and total hip replacement surgery. The patient has not yet exhausted all conservative treatment measures.    The risks and benefits of all these treatment options have been discussed in detail.     The patient " has tried at least 3 months of the above conservative treatments and continues to have disabling pain, impaired activities of daily living and worsened quality of life.  Reviewed the surgical optimization steps to optimize their chances for a successful joint replacement surgery.      She has done a nice job of maintaining healthy weight and active lifestyle.    A physical therapy prescription was ordered for the patient.  Patient will continue their home exercise program. Strategies for pain management using over-the-counter anti-inflammatory medications reviewed.  The patient was prescribed Celebrex for pain management. Discussed the potential benefit of a steroid injection and the patient was referred to my nonsurgical sports partners for consideration. Encouraged them to maintain range of motion and strength around the hip and knee joints.  They will continue to implement these strategies in addressing their pain.  The patient was provided a work note total knee acute exacerbation of the right hip joint pain to settle prior to returning.     Recommend the patient continue optimizing nonsurgical treatment interventions as outlined above for management of their arthritis.  I would be happy to see them again at any point to discuss surgery if they are more optimized or to review progress with nonsurgical treatment of arthritis.  The patient verbalizes understanding with the recommendations and treatment plan as outlined above and is in agreement.  Questions were addressed.    _____________  Belinda Bowens MD   Attending Orthopaedic Surgeon  [facility]    [facility]    Approximately 45 minutes were spent on the following tasks:              Preparing for the patient              Reviewing medical records              Taking a patient history              Performing a physical exam              Reviewing treatment options with the patient              Explaining  the risks, potential benefits, and alternative to surgery  Explaining the expected rehabilitation after each treatment option  Explaining the potential long term expectations  Evaluating the diagnostic imaging     This office note was transcribed with dictation software.  Please excuse any typographical errors, program misunderstandings leading to inadvertent insertions or deletions of inappropriate wording, pronoun errors and other unintentional transcription errors not noticed on proof-reading.

## 2024-07-02 RX ORDER — CELECOXIB 200 MG/1
200 CAPSULE ORAL 2 TIMES DAILY
Qty: 30 CAPSULE | Refills: 0 | Status: SHIPPED | OUTPATIENT
Start: 2024-07-02 | End: 2024-07-03

## 2024-07-03 RX ORDER — MELOXICAM 15 MG/1
15 TABLET ORAL DAILY
Qty: 60 TABLET | Refills: 0 | Status: SHIPPED | OUTPATIENT
Start: 2024-07-03 | End: 2024-09-01

## 2024-07-08 ENCOUNTER — APPOINTMENT (OUTPATIENT)
Dept: ORTHOPEDIC SURGERY | Facility: HOSPITAL | Age: 69
End: 2024-07-08
Payer: COMMERCIAL

## 2024-07-09 NOTE — PROGRESS NOTES
[facility]  Outpatient Clinic Note    Patient Name:  Viola Berman  MRN:  [id number]  :  1955    Referring Provider: Pavithra Peacock APRN-C*  Date of Service: 2024   Visit type: New patient visit    Virtual or Telephone Consent    A telephone visit (audio only) between the patient (at the originating site) and the provider (at the distant site) was utilized to provide this telehealth service.   Verbal consent was requested and obtained from Viola Berman on this date, 24 for a telehealth visit.        problem list/Chief complaint:  Renal Cysts - Bosniak type 2 on CT completed 6/10/24      HISTORY OF PRESENT ILLNESS:  Viola Berman is a 68 y.o. female who presents for initial Urology visit. She is a nurse at the [employer]. Patient was referred by her PCP for kidney cysts seen on CT dated 6/10/24 due to flank pain.  Patient reports chronic intermittent aching right flank pain at 6/10 that is managed with tylenol. She denies abdominal distention or early satiety. She has history of frequency, no dysuria, no hematuria, no fever or chills, no history of kidney stones. She is a smoker and is quitting using patches.      I personally reviewed Renal US dated 24.   IMPRESSION:  Slight interval enlargement of a minimally complex right renal cyst.  Follow-up can be considered in 1 year. If more definitive  characterization is needed, contrast-enhanced CT or MRI can be considered for this highly likely benign cysts    I personally reviewed CT kidney w/ contrast dated 6/10/24.   IMPRESSION:  1.  3.4 x 4.1 x 4.2 cm Bosniak type 2 right renal cyst with  additional small bilateral simple renal cysts seen. The Bosniak type  2 cyst has increased in size since 2013.  2. Minimal pericardial effusion.  3. Parenchymal scarring left lower lobe.    PAST MEDICAL HISTORY:  Past Medical History:   Diagnosis Date    Personal history of other infectious and parasitic diseases     History of hepatitis C       PAST  SURGICAL HISTORY:  Past Surgical History:   Procedure Laterality Date    COLONOSCOPY  06/03/2013    Complete Colonoscopy    COLONOSCOPY  02/13/2014    Complete Colonoscopy    CT ANGIO CORONARY ART WITH HEARTFLOW IF SCORE >30%  7/9/2021    CT HEART CORONARY ANGIOGRAM 7/9/2021 [facility]GENCY LEGACY    ESOPHAGOGASTRODUODENOSCOPY  02/27/2014    Diagnostic Esophagogastroduodenoscopy    LAPAROSCOPY DIAGNOSTIC / BIOPSY / ASPIRATION / LYSIS  02/13/2014    Exploratory Laparoscopy    OTHER SURGICAL HISTORY  06/05/2013    Chest Tube Insertion       ALLERGIES:  No Known Allergies    MEDICATIONS:  Current Outpatient Medications   Medication Instructions    acetaminophen (TYLENOL) 1,000 mg, oral, 2 times daily    albuterol 90 mcg/actuation inhaler 2 puffs, inhalation, Every 4 hours PRN    ergocalciferol (VITAMIN D-2) 50,000 Units, oral, Once Weekly    eszopiclone (LUNESTA) 2 mg, oral, Nightly PRN    fluticasone-umeclidin-vilanter (TRELEGY-ELLIPTA) 100-62.5-25 mcg blister with device 1 puff, inhalation, Daily RT    meloxicam (MOBIC) 15 mg, oral, Daily    mv-mn/folic ac/calcium/vit K1 (WOMEN'S 50 PLUS MULTIVITAMIN ORAL) 1 tablet, oral, Daily    omeprazole (PRILOSEC) 40 mg, oral, Daily    ondansetron ODT (ZOFRAN-ODT) 4 mg, oral, Every 8 hours PRN    rosuvastatin (CRESTOR) 10 mg, oral, Daily    scopolamine (Transderm-Scop) 1 mg over 3 days patch 3 day 1 patch, transdermal, Every 72 hours PRN    tiZANidine (ZANAFLEX) 4 mg, oral, 3 times daily PRN        SOCIAL HISTORY:  Social History     Tobacco Use    Smoking status: Every Day     Current packs/day: 0.25     Types: Cigarettes     Passive exposure: Never    Smokeless tobacco: Former   Substance Use Topics    Alcohol use: Never    Drug use: Never        FAMILY HISTORY:  Family History   Problem Relation Name Age of Onset    Diabetes Mother      Hypertension Mother      Pneumonia Mother      Other (Ischemic heart disease) Mother      Lung cancer Father      Diabetes type I Father       Other (AIDS) Brother      Diabetes Brother      Hypertension Brother      Other (Stroke syndrome) Brother      Colon cancer Maternal Grandfather          REVIEW OF SYSTEMS:  10-pt ROS reviewed and negative except as mentioned above.    Vital signs:  There were no vitals taken for this visit.    PHYSICAL EXAMINATION:  Telehealth visit    LABORATORY DATA:    Lab on 06/04/2024   Component Date Value    Urea Nitrogen 06/04/2024 17     Creatinine 06/04/2024 0.81     eGFR 06/04/2024 79        ASSESSMENT:  Viola Berman is a 68 y.o. female with history of kidney cysts, COPD, hepatitis C, GERD, hyperlipidemia, DVT on Xarelto who presents for initial Urology visit due to renal cysts.    We discussed that simple renal cysts are benign findings and do not require intervention unless they are causing discomfort.    PLAN:  -Reviewed CT scan with patient and discussed Bosniak score.  -Urinalysis and culture ordered to evaluate frequency and flank pain- will call patient with results  -Patient to follow up as needed    All questions and concerns were addressed. Patient verbalizes understanding and has no other questions at this time.     CATA Ibrahim-CNP  [facility] [place]  7/11/2024 10:21 AM

## 2024-07-11 ENCOUNTER — TELEMEDICINE (OUTPATIENT)
Dept: UROLOGY | Facility: HOSPITAL | Age: 69
End: 2024-07-11
Payer: COMMERCIAL

## 2024-07-11 DIAGNOSIS — N28.1 RENAL CYST: ICD-10-CM

## 2024-07-11 DIAGNOSIS — N28.1 CYST, KIDNEY, ACQUIRED: Primary | ICD-10-CM

## 2024-07-11 DIAGNOSIS — R10.9 FLANK PAIN: ICD-10-CM

## 2024-07-11 DIAGNOSIS — R35.0 URINARY FREQUENCY: ICD-10-CM

## 2024-07-11 PROCEDURE — 1160F RVW MEDS BY RX/DR IN RCRD: CPT | Performed by: NURSE PRACTITIONER

## 2024-07-11 PROCEDURE — 99203 OFFICE O/P NEW LOW 30 MIN: CPT | Performed by: NURSE PRACTITIONER

## 2024-07-11 PROCEDURE — 1159F MED LIST DOCD IN RCRD: CPT | Performed by: NURSE PRACTITIONER

## 2024-07-11 PROCEDURE — 4004F PT TOBACCO SCREEN RCVD TLK: CPT | Performed by: NURSE PRACTITIONER

## 2024-07-15 ENCOUNTER — OFFICE VISIT (OUTPATIENT)
Dept: ORTHOPEDIC SURGERY | Facility: HOSPITAL | Age: 69
End: 2024-07-15
Payer: COMMERCIAL

## 2024-07-15 DIAGNOSIS — M16.11 PRIMARY OSTEOARTHRITIS OF RIGHT HIP: Primary | ICD-10-CM

## 2024-07-15 DIAGNOSIS — S32.423A: ICD-10-CM

## 2024-07-15 PROCEDURE — 1159F MED LIST DOCD IN RCRD: CPT | Performed by: STUDENT IN AN ORGANIZED HEALTH CARE EDUCATION/TRAINING PROGRAM

## 2024-07-15 PROCEDURE — 99213 OFFICE O/P EST LOW 20 MIN: CPT | Performed by: STUDENT IN AN ORGANIZED HEALTH CARE EDUCATION/TRAINING PROGRAM

## 2024-07-15 PROCEDURE — 1125F AMNT PAIN NOTED PAIN PRSNT: CPT | Performed by: STUDENT IN AN ORGANIZED HEALTH CARE EDUCATION/TRAINING PROGRAM

## 2024-07-15 ASSESSMENT — PAIN SCALES - GENERAL: PAINLEVEL_OUTOF10: 8

## 2024-07-15 ASSESSMENT — PAIN DESCRIPTION - DESCRIPTORS: DESCRIPTORS: ACHING;THROBBING;SHARP

## 2024-07-15 ASSESSMENT — PAIN - FUNCTIONAL ASSESSMENT: PAIN_FUNCTIONAL_ASSESSMENT: 0-10

## 2024-07-15 NOTE — LETTER
July 15, 2024     Patient: Viola Berman   YOB: 1955   Date of Visit: 7/15/2024       To Whom It May Concern:    It is my medical opinion that Viola Berman be restricted to desk work the next 6 weeks.    If you have any questions or concerns, please don't hesitate to call.         Sincerely,        Belinda Bowens MD    CC: No Recipients

## 2024-07-15 NOTE — PROGRESS NOTES
Belinda Bowens MD   Adult Reconstruction and Joint Replacement Surgery  Phone: [phone]     Fax: [phone]       Name: Viola Berman  : 1955 (Age: 68 y.o.)  Date of Visit: 7/15/2024    Follow-up Hip    CC: Follow-up RIGHT hip    History of Present Illness:  This patient presents with several weeks of RIGHT hip pain. They last saw me for this problem on 2024. At the last visit, she was prescribed celebrex, which really helped. 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 Sitting for prolonged periods is challenging.     Patient has tried the following Ice, NSAIDs, Tylenol (arthritis dosing) , Activity modification, Physical therapy, Corticosteroid injections , Hyaluronic acid injections, and Xray. Date of last steroid injection: >3 months. Patient does have pain at night. Patient is able to walk 2-3 blocks. Patient is currently using crutches as assistive device. Primarily complains of groin, buttock, and lateral hip pain. Patient has difficulty with donning and doffing shoes and socks, stairs, and getting in/out of car . The pain is significantly impacting her ability to perform activities of daily living. Patient reports no longer able to do activities such as walk without pain, sleep.       Focused History  PMH: Reviewed and PE/DVT: DVT after cardiac ablation. NSVT, pSVT, prior cardiac ablation. No longer on anticoagulation. COPD.  PSH: Reviewed , Hip/Knee replacement: no, Hip/Knee surgery: no, Anesthesia complications: no, Spine surgery: no, Surgical infection: no, and Weight loss surgery: no  Meds: Reviewed, Current Anticoagulants: no, Weight loss medication: no, and Current Opioids: no  Allergies: Reviewed  and The patient reports no contraindications or allergies to cephalosporins, aspirin, NSAIDs or opioids, except as noted above.  FH: No family history of any bleeding or clotting disorders.  SHx: Reviewed, Occupation: nurse for 45 years, Current smoker: 0.5 ppd, EtOH  intake weekly: none, Social support: daughter, and Preferred physical activities: traveling, walking  Dental Hx: Last routine cleaning: last year, dentures and Discussed that all invasive dental work must be completed at least 3 months prior to joint replacement surgery. Patient understands they are to avoid any invasive dental work 3-6 months post-surgically.   Church: no       HISTORY  PROMs   No questionnaires on file.     Past Medical History:   Diagnosis Date    Personal history of other infectious and parasitic diseases     History of hepatitis C       Past Medical History:   Diagnosis Date    Personal history of other infectious and parasitic diseases     History of hepatitis C     Documented in chart and reviewed.     Past Surgical History:   Procedure Laterality Date    COLONOSCOPY  06/03/2013    Complete Colonoscopy    COLONOSCOPY  02/13/2014    Complete Colonoscopy    CT ANGIO CORONARY ART WITH HEARTFLOW IF SCORE >30%  7/9/2021    CT HEART CORONARY ANGIOGRAM 7/9/2021 [facility] LEGAC[facility]    ESOPHAGOGASTRODUODENOSCOPY  02/27/2014    Diagnostic Esophagogastroduodenoscopy    LAPAROSCOPY DIAGNOSTIC / BIOPSY / ASPIRATION / LYSIS  02/13/2014    Exploratory Laparoscopy    OTHER SURGICAL HISTORY  06/05/2013    Chest Tube Insertion       Allergies: She has No Known Allergies.     Medications:  Current Outpatient Medications   Medication Instructions    albuterol 90 mcg/actuation inhaler 2 puffs, inhalation, Every 4 hours PRN    celecoxib (CELEBREX) 200 mg, oral, 2 times daily    ergocalciferol (VITAMIN D-2) 50,000 Units, oral, Once Weekly    eszopiclone (LUNESTA) 2 mg, oral, Nightly PRN    fluticasone-umeclidin-vilanter (TRELEGY-ELLIPTA) 100-62.5-25 mcg blister with device 1 puff, inhalation, Daily RT    meloxicam (MOBIC) 15 mg, oral, Daily    omeprazole (PRILOSEC) 40 mg, oral, Daily    ondansetron ODT (ZOFRAN-ODT) 4 mg, oral, Every 8 hours PRN    rosuvastatin (CRESTOR) 10 mg, oral, Daily    tiZANidine  (ZANAFLEX) 4 mg, oral, 3 times daily PRN       Family History   Problem Relation Name Age of Onset    Diabetes Mother      Hypertension Mother      Pneumonia Mother      Other (Ischemic heart disease) Mother      Lung cancer Father      Diabetes type I Father      Other (AIDS) Brother      Diabetes Brother      Hypertension Brother      Other (Stroke syndrome) Brother      Colon cancer Maternal Grandfather       Documented in chart and reviewed.     Social History     Tobacco Use    Smoking status: Every Day     Current packs/day: 0.25     Types: Cigarettes     Passive exposure: Never    Smokeless tobacco: Former   Substance Use Topics    Alcohol use: Never        Review of Systems:  Review of systems completed with medical assistant intake. Please refer to this note.     Physical Exam:  BMI:  23, which is normal.     General: The patient is well appearing, alert and oriented to time, place and person and has an appropriate affect.     Neurological Examination: Sensation normal, motor function normal, coordination / cerebellum normal, reflexes normal.    Cardiovascular Exam: Capillary refill normal, arterial pulses normal, no varicose veins, no edema.    Skin Exam: Skin thoughout the upper and lower extremities is normal without any evidence of infection or rash.    Gait: patient ambulates with a coxalgic gait.    Right Hip Examination:  Gait: Coxalgic gait.     Examination of the hip reveals the skin to be intact.     There is mild tenderness over the greater trochanter.    There is no obvious swelling.     There is a positive Stinchfield test.    Range of motion is: full extension to 90 degrees of flexion.    The hip internally rotates to 10 degrees and externally rotates to 30 degrees.     Abduction is 30 degrees and adduction is 10 degrees.    There is groin and buttock pain with hip motion.     There is a negative straight leg raise.     Left Hip Examination:  Examination of the hip reveals the skin to be  intact.    There is no tenderness over the greater trochanter.    There is no obvious swelling.     There is a negative Stinchfield test.    Range of motion is full extension to 100 degrees of flexion.    The hip internally rotates to 20 and externally rotates 40 degrees.     Abduction is 50 degrees and adduction is 20 degrees.     There is no groin and buttock pain with hip motion.     There is a negative straight leg raise.     Right Knee Examination:  Examination of the right knee reveals the skin to be intact. There is no obvious swelling.     There is no tenderness to palpation.     Range of motion is full extension to 120 degrees of flexion.     The knee is stable.     There is no grinding with range of motion.     There is no patellofemoral crepitus.     Left Knee Examination:  Examination of the left knee reveals the skin to be intact. There is no obvious swelling.     There is no tenderness to palpation.     Range of motion is full extension to 120 degrees of flexion.     The knee is stable.     There is no grinding with range of motion.     There is no patellofemoral crepitus.    Imaging:  X-rays were personally reviewed today and show no interval changes      Impression and Plan:  Radiographs were personally reviewed today with the patient. There is evidence of moderate RIGHT  hip osteoarthritis without bone on bone apposition.  There is also a nondisplaced fracture of the posterior wall rim osteophyte of the acetabulum.    DIAGNOSIS:   Primary osteoarthritis of right hip    Closed fracture of posterior wall of acetabulum, initial encounter (Multi)     I have discussed the options in detail with the patient. We have discussed anti-inflammatory medication, activity modification, physical therapy, corticosteroid injections, and total hip replacement surgery.  Patient is responding well to nonsurgical treatment of arthritis possibly incidental posterior wall of acetabulum fracture.    Encouraged the patient  to continue physical therapy. Patient will continue their home exercise program.Strategies for pain management using over-the-counter anti-inflammatory medications reviewed.  The patient was prescribed Celebrex for pain management.  I encouraged her to consider cortisone injection into the right hip was given referral to my nonsurgical sports partners.  Encouraged them to maintain range of motion and strength around the hip and knee joints.  They will continue to implement these strategies in addressing their pain.       Recommend the patient continue optimizing nonsurgical treatment interventions as outlined above for management of their joint pain.  I would be happy to see them again at any point to discuss surgery if they are more optimized or to review progress with nonsurgical treatment of arthritis.  The patient verbalizes understanding with the recommendations and treatment plan as outlined above and is in agreement.  Questions were addressed.    RTC: As needed    X-rays at next visit: As needed    _____________________  Belinda Bowens MD   Attending Orthopaedic Surgeon  [facility]    [facility]    This office note was transcribed with dictation software.  Please excuse any typographical errors, program misunderstandings leading to inadvertent insertions or deletions of inappropriate wording, pronoun errors and other unintentional transcription errors not noticed on proof-reading.

## 2024-07-16 ENCOUNTER — OFFICE VISIT (OUTPATIENT)
Dept: CARDIOLOGY | Facility: CLINIC | Age: 69
End: 2024-07-16
Payer: COMMERCIAL

## 2024-07-16 ENCOUNTER — LAB (OUTPATIENT)
Dept: LAB | Facility: LAB | Age: 69
End: 2024-07-16
Payer: COMMERCIAL

## 2024-07-16 ENCOUNTER — HOSPITAL ENCOUNTER (OUTPATIENT)
Dept: RADIOLOGY | Facility: HOSPITAL | Age: 69
Discharge: HOME | End: 2024-07-16
Payer: COMMERCIAL

## 2024-07-16 VITALS
WEIGHT: 137 LBS | SYSTOLIC BLOOD PRESSURE: 130 MMHG | DIASTOLIC BLOOD PRESSURE: 90 MMHG | BODY MASS INDEX: 22.82 KG/M2 | OXYGEN SATURATION: 98 % | HEIGHT: 65 IN | HEART RATE: 77 BPM

## 2024-07-16 DIAGNOSIS — I47.10 PAROXYSMAL SUPRAVENTRICULAR TACHYCARDIA (CMS-HCC): Primary | ICD-10-CM

## 2024-07-16 DIAGNOSIS — I31.39 PERICARDIAL EFFUSION (HHS-HCC): ICD-10-CM

## 2024-07-16 DIAGNOSIS — R06.02 SHORTNESS OF BREATH: ICD-10-CM

## 2024-07-16 DIAGNOSIS — R07.89 ATYPICAL CHEST PAIN: ICD-10-CM

## 2024-07-16 DIAGNOSIS — R10.9 FLANK PAIN: ICD-10-CM

## 2024-07-16 DIAGNOSIS — R35.0 URINARY FREQUENCY: ICD-10-CM

## 2024-07-16 DIAGNOSIS — R00.2 PALPITATIONS: ICD-10-CM

## 2024-07-16 LAB
APPEARANCE UR: CLEAR
BILIRUB UR STRIP.AUTO-MCNC: NEGATIVE MG/DL
COLOR UR: YELLOW
GLUCOSE UR STRIP.AUTO-MCNC: NORMAL MG/DL
HOLD SPECIMEN: NORMAL
KETONES UR STRIP.AUTO-MCNC: NEGATIVE MG/DL
LEUKOCYTE ESTERASE UR QL STRIP.AUTO: NEGATIVE
NITRITE UR QL STRIP.AUTO: NEGATIVE
PH UR STRIP.AUTO: 5.5 [PH]
PROT UR STRIP.AUTO-MCNC: NEGATIVE MG/DL
RBC # UR STRIP.AUTO: NEGATIVE /UL
SP GR UR STRIP.AUTO: 1.02
UROBILINOGEN UR STRIP.AUTO-MCNC: ABNORMAL MG/DL

## 2024-07-16 PROCEDURE — 71046 X-RAY EXAM CHEST 2 VIEWS: CPT | Performed by: RADIOLOGY

## 2024-07-16 PROCEDURE — 4004F PT TOBACCO SCREEN RCVD TLK: CPT | Performed by: STUDENT IN AN ORGANIZED HEALTH CARE EDUCATION/TRAINING PROGRAM

## 2024-07-16 PROCEDURE — 1159F MED LIST DOCD IN RCRD: CPT | Performed by: STUDENT IN AN ORGANIZED HEALTH CARE EDUCATION/TRAINING PROGRAM

## 2024-07-16 PROCEDURE — 71046 X-RAY EXAM CHEST 2 VIEWS: CPT

## 2024-07-16 PROCEDURE — 81003 URINALYSIS AUTO W/O SCOPE: CPT

## 2024-07-16 PROCEDURE — 99215 OFFICE O/P EST HI 40 MIN: CPT | Performed by: STUDENT IN AN ORGANIZED HEALTH CARE EDUCATION/TRAINING PROGRAM

## 2024-07-16 RX ORDER — CELECOXIB 200 MG/1
200 CAPSULE ORAL 2 TIMES DAILY
Qty: 60 CAPSULE | Refills: 1 | Status: SHIPPED | OUTPATIENT
Start: 2024-07-16 | End: 2024-09-14

## 2024-07-16 NOTE — PATIENT INSTRUCTIONS
Please continue current cardiac medications including rosuvastatin 10 mg daily.    We will obtain a transthoracic echocardiogram for structural evaluation including ejection fraction, assessment of regional wall motion abnormalities or valvular disease, and further evaluation of hemodynamics.    Please followup with me in Cardiology clinic within the next 1 year.  Please return to clinic sooner or seek emergent care if your symptoms reoccur or worsen.

## 2024-07-16 NOTE — PROGRESS NOTES
Follow up    HPI:    Viola Berman is a 68 y.o. female with pertinent history of tobacco abuse, COPD, dizziness, family history of premature coronary artery disease, short episodes of paroxysmal supraventricular tachycardia lasting up to 12 beats and ventricular ectopy with a 6 beat run of nonsustained ventricular tachycardia on event monitor performed April 2021, EP study with Dr Jenkins with no evidence of accessory pathway or AVNRT complicated by right groin pain with partial nonocclusive DVT in right common femoral vein on Xarelto 8/4/2022, short episodes of paroxysmal SVT up to 13 beats on event monitor performed September 2022, CT calcium score of 0, no clear ischemia nuclear stress test performed 6/2/2021, preserved ejection fraction with impaired relaxation echo performed 6/2/2021, coronary CT angiography with heart flow revealing normal coronary anatomy with mild atherosclerosis and no obstructive lesions performed 7/9/2021 presents for follow-up.     She is doing relatively well.  She has occasional palpitations a few times per week but they are relatively short.  She did have a CT of the kidneys performed 6/10/2024 that did reveal minimal pericardial effusion.  We did discuss the natural history of pericardial effusion.  Dyspnea on exertion stable.  Blood pressure has been relatively well-controlled.  We reviewed her prior echocardiogram.  No exacerbating or relieving factors.  Patient denies chest pain and angina.  Pt denies orthopnea, and paroxysmal nocturnal dyspnea.  Pt denies worsening lower extremity edema.  Pt denies palpitations or syncope.  No recent falls.  No fever or chills.  No cough.  No change in bowel or bladder habits.  No sick contacts.  No recent travel.    12 point review of systems including (Constitutional, Eyes, ENMT, Respiratory, Cardiac, Gastrointestinal, Neurological, Psychiatric, and Hematologic) was performed and is otherwise negative.    Past medical history reviewed:   has a  past medical history of Personal history of other infectious and parasitic diseases.    Past surgical history reviewed:   has a past surgical history that includes Colonoscopy (06/03/2013); Other surgical history (06/05/2013); Esophagogastroduodenoscopy (02/27/2014); Colonoscopy (02/13/2014); Laparoscopy diagnostic / biopsy / aspiration / lysis (02/13/2014); and CT angio coronary art with heartflow if score >30% (7/9/2021).    Social history reviewed:   reports that she has been smoking cigarettes. She has never been exposed to tobacco smoke. She has quit using smokeless tobacco. She reports that she does not drink alcohol and does not use drugs.     Family history reviewed:    Family History   Problem Relation Name Age of Onset    Diabetes Mother      Hypertension Mother      Pneumonia Mother      Other (Ischemic heart disease) Mother      Lung cancer Father      Diabetes type I Father      Other (AIDS) Brother      Diabetes Brother      Hypertension Brother      Other (Stroke syndrome) Brother      Colon cancer Maternal Grandfather         Allergies reviewed: Patient has no known allergies.     Medications reviewed:   Current Outpatient Medications   Medication Instructions    albuterol 90 mcg/actuation inhaler 2 puffs, inhalation, Every 4 hours PRN    celecoxib (CELEBREX) 200 mg, oral, 2 times daily    ergocalciferol (VITAMIN D-2) 50,000 Units, oral, Once Weekly    eszopiclone (LUNESTA) 2 mg, oral, Nightly PRN    fluticasone-umeclidin-vilanter (TRELEGY-ELLIPTA) 100-62.5-25 mcg blister with device 1 puff, inhalation, Daily RT    meloxicam (MOBIC) 15 mg, oral, Daily    mv-mn/folic ac/calcium/vit K1 (WOMEN'S 50 PLUS MULTIVITAMIN ORAL) 1 tablet, oral, Daily    omeprazole (PRILOSEC) 40 mg, oral, Daily    ondansetron ODT (ZOFRAN-ODT) 4 mg, oral, Every 8 hours PRN    rosuvastatin (CRESTOR) 10 mg, oral, Daily    scopolamine (Transderm-Scop) 1 mg over 3 days patch 3 day 1 patch, transdermal, Every 72 hours PRN     tiZANidine (ZANAFLEX) 4 mg, oral, 3 times daily PRN        Vitals reviewed: Visit Vitals  /90   Pulse 77       Physical Exam:   General:  Patient is awake, alert, and oriented.  Patient is in no acute distress.  HEENT:  Pupils equal and reactive.  Normocephalic.  Moist mucosa.    Neck:  No thyromegaly.  Normal Jugular Venous Pressure.  Cardiovascular:  Regular rate and rhythm.  Normal S1 and S2.  1/6 TIFFANY.  Pulmonary:  Clear to auscultation bilaterally.  Abdomen:  Soft. Non-tender.   Non-distended.  Positive bowel sounds.  Lower Extremities:  2+ pedal pulses. No LE edema.  Neurologic:  Cranial nerves intact.  No focal deficit.   Skin: Skin warm and dry, normal skin turgor.   Psychiatric: Normal affect.    Last Labs:  CBC -      Lab Results   Component Value Date    WBC 5.8 04/16/2024    HGB 13.5 04/16/2024    HCT 41.0 04/16/2024     04/16/2024        CMP-  Lab Results   Component Value Date    GLUCOSE 95 04/16/2024     04/16/2024    K 3.8 04/16/2024     04/16/2024    CO2 27 04/16/2024    ANIONGAP 12 04/16/2024    BUN 17 06/04/2024    CREATININE 0.81 06/04/2024    EGFR 79 06/04/2024    CALCIUM 9.4 04/16/2024    PROT 7.0 04/16/2024    ALBUMIN 4.3 04/16/2024    AST 13 04/16/2024    ALT 8 04/16/2024    ALKPHOS 90 04/16/2024    BILITOT 0.5 04/16/2024        LIPIDS-  Lab Results   Component Value Date    CHOL 137 04/16/2024    TRIG 58 04/16/2024    HDL 55.9 04/16/2024    CHHDL 2.5 04/16/2024    VLDL 12 04/16/2024        OTHERS-  Lab Results   Component Value Date    HGBA1C 5.4 04/16/2024        I personally reviewed the patient's recent vitals, labs, medications, orders, EKGs, pertinent cardiac imaging/ echocardiography and ischemic evaluations including stress testing/ cardiac catheterization.    Assessment and Plan:    Viola Berman is a 68 y.o. female with pertinent history of tobacco abuse, COPD, dizziness, family history of premature coronary artery disease, short episodes of paroxysmal  supraventricular tachycardia lasting up to 12 beats and ventricular ectopy with a 6 beat run of nonsustained ventricular tachycardia on event monitor performed April 2021, EP study with Dr Jenkins with no evidence of accessory pathway or AVNRT complicated by right groin pain with partial nonocclusive DVT in right common femoral vein on Xarelto 8/4/2022, short episodes of paroxysmal SVT up to 13 beats on event monitor performed September 2022, CT calcium score of 0, no clear ischemia nuclear stress test performed 6/2/2021, preserved ejection fraction with impaired relaxation echo performed 6/2/2021, coronary CT angiography with heart flow revealing normal coronary anatomy with mild atherosclerosis and no obstructive lesions performed 7/9/2021 presents for follow-up.   She is doing relatively well.  She has occasional palpitations a few times per week but they are relatively short.  She did have a CT of the kidneys performed 6/10/2024 that did reveal minimal pericardial effusion.  We did discuss the natural history of pericardial effusion.  Dyspnea on exertion stable.  Blood pressure has been relatively well-controlled.  We reviewed her prior echocardiogram.      Of note, the past she did not tolerate beta-blockers and had an unsuccessful ablation therapy.  At this point we will monitor symptoms.    Please continue current cardiac medications including rosuvastatin 10 mg daily.    We will obtain a transthoracic echocardiogram for structural evaluation including ejection fraction, assessment of regional wall motion abnormalities or valvular disease, and further evaluation of hemodynamics.    Please followup with me in Cardiology clinic within the next 1 year.  Please return to clinic sooner or seek emergent care if your symptoms reoccur or worsen.    Thank you for allowing me to participate in their care.  Please feel free to call me with any further questions or concerns.        Evans Castano MD, FACC, NANI,  MINOR  Division of Cardiovascular Medicine  System Director, Nuclear Cardiology   Medical Director, [facility], [facility]   Clinical , [employer]  [email or web address]   Office:  [phone]

## 2024-07-18 ENCOUNTER — APPOINTMENT (OUTPATIENT)
Dept: ORTHOPEDIC SURGERY | Facility: CLINIC | Age: 69
End: 2024-07-18
Payer: COMMERCIAL

## 2024-08-08 ENCOUNTER — APPOINTMENT (OUTPATIENT)
Dept: ORTHOPEDIC SURGERY | Facility: CLINIC | Age: 69
End: 2024-08-08
Payer: COMMERCIAL

## 2024-08-08 DIAGNOSIS — S76.011A TEAR OF RIGHT GLUTEUS MEDIUS TENDON, INITIAL ENCOUNTER: Primary | ICD-10-CM

## 2024-08-08 DIAGNOSIS — M16.11 PRIMARY OSTEOARTHRITIS OF RIGHT HIP: ICD-10-CM

## 2024-08-08 PROCEDURE — 1159F MED LIST DOCD IN RCRD: CPT | Performed by: STUDENT IN AN ORGANIZED HEALTH CARE EDUCATION/TRAINING PROGRAM

## 2024-08-08 PROCEDURE — 99213 OFFICE O/P EST LOW 20 MIN: CPT | Performed by: STUDENT IN AN ORGANIZED HEALTH CARE EDUCATION/TRAINING PROGRAM
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
## 2024-08-22 ENCOUNTER — HOSPITAL ENCOUNTER (OUTPATIENT)
Dept: RADIOLOGY | Facility: HOSPITAL | Age: 69
Discharge: HOME | End: 2024-08-22
Payer: COMMERCIAL

## 2024-08-22 DIAGNOSIS — S76.011A TEAR OF RIGHT GLUTEUS MEDIUS TENDON, INITIAL ENCOUNTER: ICD-10-CM

## 2024-08-22 PROCEDURE — 73721 MRI JNT OF LWR EXTRE W/O DYE: CPT | Mod: RT

## 2024-08-28 ENCOUNTER — ANCILLARY PROCEDURE (OUTPATIENT)
Dept: CARDIOLOGY | Facility: CLINIC | Age: 69
End: 2024-08-28
Payer: COMMERCIAL

## 2024-08-28 DIAGNOSIS — R07.89 ATYPICAL CHEST PAIN: ICD-10-CM

## 2024-08-28 DIAGNOSIS — R00.2 PALPITATIONS: ICD-10-CM

## 2024-08-28 DIAGNOSIS — I47.10 PAROXYSMAL SUPRAVENTRICULAR TACHYCARDIA (CMS-HCC): ICD-10-CM

## 2024-08-28 DIAGNOSIS — I31.39 PERICARDIAL EFFUSION (HHS-HCC): ICD-10-CM

## 2024-08-28 LAB
AORTIC VALVE MEAN GRADIENT: 4.8 MMHG
AORTIC VALVE PEAK VELOCITY: 1.49 M/S
AV PEAK GRADIENT: 8.9 MMHG
AVA (PEAK VEL): 2.37 CM2
AVA (VTI): 2.76 CM2
EJECTION FRACTION APICAL 4 CHAMBER: 54.1
EJECTION FRACTION: 53 %
LEFT ATRIUM VOLUME AREA LENGTH INDEX BSA: 42.7 ML/M2
LEFT VENTRICLE INTERNAL DIMENSION DIASTOLE: 4.61 CM (ref 3.5–6)
LEFT VENTRICULAR OUTFLOW TRACT DIAMETER: 1.99 CM
MITRAL VALVE E/A RATIO: 0.88
RIGHT VENTRICLE FREE WALL PEAK S': 15 CM/S
RIGHT VENTRICLE PEAK SYSTOLIC PRESSURE: 25.7 MMHG
TRICUSPID ANNULAR PLANE SYSTOLIC EXCURSION: 2.3 CM

## 2024-08-28 PROCEDURE — 93306 TTE W/DOPPLER COMPLETE: CPT

## 2024-08-28 PROCEDURE — 93306 TTE W/DOPPLER COMPLETE: CPT | Performed by: STUDENT IN AN ORGANIZED HEALTH CARE EDUCATION/TRAINING PROGRAM

## 2024-08-29 ENCOUNTER — APPOINTMENT (OUTPATIENT)
Dept: ORTHOPEDIC SURGERY | Facility: CLINIC | Age: 69
End: 2024-08-29
Payer: COMMERCIAL

## 2024-08-29 ENCOUNTER — HOSPITAL ENCOUNTER (OUTPATIENT)
Dept: RADIOLOGY | Facility: EXTERNAL LOCATION | Age: 69
Discharge: HOME | End: 2024-08-29

## 2024-08-29 DIAGNOSIS — S76.011A TEAR OF RIGHT GLUTEUS MEDIUS TENDON, INITIAL ENCOUNTER: ICD-10-CM

## 2024-08-29 DIAGNOSIS — M16.11 ARTHRITIS OF RIGHT HIP: Primary | ICD-10-CM

## 2024-08-29 PROCEDURE — 20611 DRAIN/INJ JOINT/BURSA W/US: CPT | Performed by: STUDENT IN AN ORGANIZED HEALTH CARE EDUCATION/TRAINING PROGRAM

## 2024-08-29 PROCEDURE — 99214 OFFICE O/P EST MOD 30 MIN: CPT | Performed by: STUDENT IN AN ORGANIZED HEALTH CARE EDUCATION/TRAINING PROGRAM

## 2024-08-29 RX ORDER — ROPIVACAINE HYDROCHLORIDE 5 MG/ML
3 INJECTION, SOLUTION EPIDURAL; INFILTRATION; PERINEURAL
Status: COMPLETED | OUTPATIENT
Start: 2024-08-29 | End: 2024-08-29

## 2024-08-29 RX ORDER — LIDOCAINE HYDROCHLORIDE 10 MG/ML
2 INJECTION, SOLUTION EPIDURAL; INFILTRATION; INTRACAUDAL; PERINEURAL
Status: COMPLETED | OUTPATIENT
Start: 2024-08-29 | End: 2024-08-29

## 2024-08-29 RX ORDER — METHYLPREDNISOLONE ACETATE 40 MG/ML
40 INJECTION, SUSPENSION INTRA-ARTICULAR; INTRALESIONAL; INTRAMUSCULAR; SOFT TISSUE
Status: COMPLETED | OUTPATIENT
Start: 2024-08-29 | End: 2024-08-29
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
## 2024-09-12 ENCOUNTER — HOSPITAL ENCOUNTER (OUTPATIENT)
Dept: VASCULAR MEDICINE | Facility: HOSPITAL | Age: 69
Discharge: HOME | End: 2024-09-12
Payer: COMMERCIAL

## 2024-09-12 ENCOUNTER — APPOINTMENT (OUTPATIENT)
Dept: ORTHOPEDIC SURGERY | Facility: CLINIC | Age: 69
End: 2024-09-12
Payer: COMMERCIAL

## 2024-09-12 DIAGNOSIS — M54.16 LUMBAR RADICULOPATHY: ICD-10-CM

## 2024-09-12 DIAGNOSIS — M79.89 RIGHT LEG SWELLING: ICD-10-CM

## 2024-09-12 DIAGNOSIS — M79.89 RIGHT LEG SWELLING: Primary | ICD-10-CM

## 2024-09-12 DIAGNOSIS — M79.604 PAIN IN RIGHT LEG: ICD-10-CM

## 2024-09-12 PROCEDURE — 93971 EXTREMITY STUDY: CPT | Performed by: INTERNAL MEDICINE

## 2024-09-12 PROCEDURE — 99213 OFFICE O/P EST LOW 20 MIN: CPT | Performed by: STUDENT IN AN ORGANIZED HEALTH CARE EDUCATION/TRAINING PROGRAM

## 2024-09-12 PROCEDURE — 93971 EXTREMITY STUDY: CPT

## 2024-09-12 RX ORDER — METHYLPREDNISOLONE 4 MG/1
TABLET ORAL
Qty: 21 TABLET | Refills: 0 | Status: SHIPPED | OUTPATIENT
Start: 2024-09-12
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
## 2024-09-18 ENCOUNTER — OFFICE VISIT (OUTPATIENT)
Dept: CARDIOLOGY | Facility: CLINIC | Age: 69
End: 2024-09-18
Payer: COMMERCIAL

## 2024-09-18 VITALS
OXYGEN SATURATION: 98 % | DIASTOLIC BLOOD PRESSURE: 84 MMHG | BODY MASS INDEX: 22.82 KG/M2 | HEIGHT: 65 IN | SYSTOLIC BLOOD PRESSURE: 164 MMHG | HEART RATE: 75 BPM | WEIGHT: 137 LBS

## 2024-09-18 DIAGNOSIS — R07.89 ATYPICAL CHEST PAIN: ICD-10-CM

## 2024-09-18 DIAGNOSIS — R00.2 PALPITATIONS: ICD-10-CM

## 2024-09-18 DIAGNOSIS — I47.10 PAROXYSMAL SUPRAVENTRICULAR TACHYCARDIA (CMS-HCC): Primary | ICD-10-CM

## 2024-09-18 PROCEDURE — 4004F PT TOBACCO SCREEN RCVD TLK: CPT | Performed by: STUDENT IN AN ORGANIZED HEALTH CARE EDUCATION/TRAINING PROGRAM

## 2024-09-18 PROCEDURE — 3008F BODY MASS INDEX DOCD: CPT | Performed by: STUDENT IN AN ORGANIZED HEALTH CARE EDUCATION/TRAINING PROGRAM

## 2024-09-18 PROCEDURE — 99215 OFFICE O/P EST HI 40 MIN: CPT | Performed by: STUDENT IN AN ORGANIZED HEALTH CARE EDUCATION/TRAINING PROGRAM

## 2024-09-18 PROCEDURE — 1159F MED LIST DOCD IN RCRD: CPT | Performed by: STUDENT IN AN ORGANIZED HEALTH CARE EDUCATION/TRAINING PROGRAM

## 2024-09-18 ASSESSMENT — ENCOUNTER SYMPTOMS: OCCASIONAL FEELINGS OF UNSTEADINESS: 1

## 2024-09-18 NOTE — PATIENT INSTRUCTIONS
We will start diltiazem extended release 120 mg once daily.  We will see if this helps significantly with extra heartbeats or palpitations.    Please continue current cardiac medications including rosuvastatin 10 mg daily.    Please followup with me in Cardiology clinic within the next 4 to 6 weeks.    Please return to clinic sooner or seek emergent care if your symptoms reoccur or worsen.

## 2024-09-18 NOTE — PROGRESS NOTES
Follow up    HPI:    Viola Berman is a 68 y.o. female with pertinent history of tobacco abuse, COPD, dizziness, family history of premature coronary artery disease, short episodes of paroxysmal supraventricular tachycardia lasting up to 12 beats and ventricular ectopy with a 6 beat run of nonsustained ventricular tachycardia on event monitor performed April 2021, EP study with Dr Jenkins with no evidence of accessory pathway or AVNRT complicated by right groin pain with partial nonocclusive DVT in right common femoral vein on Xarelto 8/4/2022, short episodes of paroxysmal SVT up to 13 beats on event monitor performed September 2022, CT calcium score of 0, no clear ischemia nuclear stress test performed 6/2/2021, coronary CT angiography with heart flow revealing normal coronary anatomy with mild atherosclerosis and no obstructive lesions performed 7/9/2021, low normal LVEF of 50 to 55% with impaired relaxation, moderately dilated left atrium, moderately thickened mitral valve without significant stenosis on echo performed 8/28/2024 presents for follow-up.     She continues to have significant palpitations.  They seem to be significantly worse since her last visit.  Of note in the past she did not tolerate beta-blockers.  We discussed her recent echocardiogram at length and reviewed images.  No exacerbating or relieving factors.  Patient denies chest pain and angina.  Pt denies orthopnea, and paroxysmal nocturnal dyspnea.  Pt denies worsening lower extremity edema.  Pt denies syncope.  No recent falls.  No fever or chills.  No cough.  No change in bowel or bladder habits.  No sick contacts.  No recent travel.    12 point review of systems including (Constitutional, Eyes, ENMT, Respiratory, Cardiac, Gastrointestinal, Neurological, Psychiatric, and Hematologic) was performed and is otherwise negative.    Past medical history reviewed:   has a past medical history of Personal history of other infectious and parasitic  diseases.    Past surgical history reviewed:   has a past surgical history that includes Colonoscopy (06/03/2013); Other surgical history (06/05/2013); Esophagogastroduodenoscopy (02/27/2014); Colonoscopy (02/13/2014); Laparoscopy diagnostic / biopsy / aspiration / lysis (02/13/2014); and CT angio coronary art with heartflow if score >30% (7/9/2021).    Social history reviewed:   reports that she has been smoking cigarettes. She has never been exposed to tobacco smoke. She has quit using smokeless tobacco. She reports that she does not drink alcohol and does not use drugs.     Family history reviewed:    Family History   Problem Relation Name Age of Onset    Diabetes Mother      Hypertension Mother      Pneumonia Mother      Other (Ischemic heart disease) Mother      Lung cancer Father      Diabetes type I Father      Other (AIDS) Brother      Diabetes Brother      Hypertension Brother      Other (Stroke syndrome) Brother      Colon cancer Maternal Grandfather         Allergies reviewed: Patient has no known allergies.     Medications reviewed:   Current Outpatient Medications   Medication Instructions    albuterol 90 mcg/actuation inhaler 2 puffs, inhalation, Every 4 hours PRN    ergocalciferol (VITAMIN D-2) 50,000 Units, oral, Once Weekly    eszopiclone (LUNESTA) 2 mg, oral, Nightly PRN    fluticasone-umeclidin-vilanter (TRELEGY-ELLIPTA) 100-62.5-25 mcg blister with device 1 puff, inhalation, Daily RT    methylPREDNISolone (Medrol Dospak) 4 mg tablets Use as directed by package instructions. Take 6 pills on day one, take 5 pills on day two, take 4 pills on day three, take 3 pills on day four, take 2 pills on day five, take 1 pill on day six.    omeprazole (PRILOSEC) 40 mg, oral, Daily    ondansetron ODT (ZOFRAN-ODT) 4 mg, oral, Every 8 hours PRN    rosuvastatin (CRESTOR) 10 mg, oral, Daily    tiZANidine (ZANAFLEX) 4 mg, oral, 3 times daily PRN        Vitals reviewed: Visit Vitals  /84   Pulse 75        Physical Exam:   General:  Patient is awake, alert, and oriented.  Patient is in no acute distress.  HEENT:  Pupils equal and reactive.  Normocephalic.  Moist mucosa.    Neck:  No thyromegaly.  Normal Jugular Venous Pressure.  Cardiovascular:  Regular rate and rhythm.  Normal S1 and S2.  1/6 TIFFANY.  Pulmonary:  Clear to auscultation bilaterally.  Abdomen:  Soft. Non-tender.   Non-distended.  Positive bowel sounds.  Lower Extremities:  2+ pedal pulses. No LE edema.  Neurologic:  Cranial nerves intact.  No focal deficit.   Skin: Skin warm and dry, normal skin turgor.   Psychiatric: Normal affect.    Last Labs:  CBC -      Lab Results   Component Value Date    WBC 5.8 04/16/2024    HGB 13.5 04/16/2024    HCT 41.0 04/16/2024     04/16/2024        CMP-  Lab Results   Component Value Date    GLUCOSE 95 04/16/2024     04/16/2024    K 3.8 04/16/2024     04/16/2024    CO2 27 04/16/2024    ANIONGAP 12 04/16/2024    BUN 17 06/04/2024    CREATININE 0.81 06/04/2024    EGFR 79 06/04/2024    CALCIUM 9.4 04/16/2024    PROT 7.0 04/16/2024    ALBUMIN 4.3 04/16/2024    AST 13 04/16/2024    ALT 8 04/16/2024    ALKPHOS 90 04/16/2024    BILITOT 0.5 04/16/2024        LIPIDS-  Lab Results   Component Value Date    CHOL 137 04/16/2024    TRIG 58 04/16/2024    HDL 55.9 04/16/2024    CHHDL 2.5 04/16/2024    VLDL 12 04/16/2024        OTHERS-  Lab Results   Component Value Date    HGBA1C 5.4 04/16/2024        I personally reviewed the patient's recent vitals, labs, medications, orders, EKGs, pertinent cardiac imaging/ echocardiography and ischemic evaluations including stress testing/ cardiac catheterization.    Assessment and Plan:    Viola Berman is a 68 y.o. female with pertinent history of tobacco abuse, COPD, dizziness, family history of premature coronary artery disease, short episodes of paroxysmal supraventricular tachycardia lasting up to 12 beats and ventricular ectopy with a 6 beat run of nonsustained  ventricular tachycardia on event monitor performed April 2021, EP study with Dr Jenkins with no evidence of accessory pathway or AVNRT complicated by right groin pain with partial nonocclusive DVT in right common femoral vein on Xarelto 8/4/2022, short episodes of paroxysmal SVT up to 13 beats on event monitor performed September 2022, CT calcium score of 0, no clear ischemia nuclear stress test performed 6/2/2021, coronary CT angiography with heart flow revealing normal coronary anatomy with mild atherosclerosis and no obstructive lesions performed 7/9/2021, low normal LVEF of 50 to 55% with impaired relaxation, moderately dilated left atrium, moderately thickened mitral valve without significant stenosis on echo performed 8/28/2024 presents for follow-up.  She continues to have significant palpitations.  They seem to be significantly worse since her last visit.  Of note in the past she did not tolerate beta-blockers.  We discussed her recent echocardiogram at length and reviewed images.     We will start with empiric therapy with a calcium channel blocker/diltiazem.  This is not effective I would plan to repeat an event monitor and arrange follow-up with electrophysiology for further evaluation.  Of note, the past she did not tolerate beta-blockers and had an unsuccessful ablation therapy.  At this point we will monitor symptoms.    We will start diltiazem extended release 120 mg once daily.  We will see if this helps significantly with extra heartbeats or palpitations.    Please continue current cardiac medications including rosuvastatin 10 mg daily.    Please followup with me in Cardiology clinic within the next 4 to 6 weeks.    Please return to clinic sooner or seek emergent care if your symptoms reoccur or worsen.    Thank you for allowing me to participate in their care.  Please feel free to call me with any further questions or concerns.        Evans Castano MD, FACC, NANI, VAHIDNC  Division of  Cardiovascular Medicine  System Director, Nuclear Cardiology   Medical Director, [facility], [facility]   Clinical , [employer]  [email or web address]   Office:  [phone]

## 2024-09-20 DIAGNOSIS — R00.2 PALPITATIONS: ICD-10-CM

## 2024-09-20 DIAGNOSIS — I47.10 PAROXYSMAL SUPRAVENTRICULAR TACHYCARDIA (CMS-HCC): Primary | ICD-10-CM

## 2024-09-20 RX ORDER — DILTIAZEM HYDROCHLORIDE 120 MG/1
120 CAPSULE, COATED, EXTENDED RELEASE ORAL DAILY
Qty: 30 CAPSULE | Refills: 11 | Status: SHIPPED | OUTPATIENT
Start: 2024-09-20 | End: 2025-09-20

## 2024-09-25 ENCOUNTER — EVALUATION (OUTPATIENT)
Dept: PHYSICAL THERAPY | Facility: CLINIC | Age: 69
End: 2024-09-25
Payer: COMMERCIAL

## 2024-09-25 DIAGNOSIS — M54.50 CHRONIC LOW BACK PAIN, UNSPECIFIED BACK PAIN LATERALITY, UNSPECIFIED WHETHER SCIATICA PRESENT: ICD-10-CM

## 2024-09-25 DIAGNOSIS — S76.011A TEAR OF RIGHT GLUTEUS MEDIUS TENDON, INITIAL ENCOUNTER: ICD-10-CM

## 2024-09-25 DIAGNOSIS — M16.11 PRIMARY OSTEOARTHRITIS OF RIGHT HIP: ICD-10-CM

## 2024-09-25 DIAGNOSIS — M25.551 RIGHT HIP PAIN: Primary | ICD-10-CM

## 2024-09-25 DIAGNOSIS — G89.29 CHRONIC LOW BACK PAIN, UNSPECIFIED BACK PAIN LATERALITY, UNSPECIFIED WHETHER SCIATICA PRESENT: ICD-10-CM

## 2024-09-25 PROCEDURE — 97161 PT EVAL LOW COMPLEX 20 MIN: CPT | Mod: GP

## 2024-09-25 PROCEDURE — 97110 THERAPEUTIC EXERCISES: CPT | Mod: GP

## 2024-09-25 NOTE — PROGRESS NOTES
Physical Therapy Evaluation    Patient Name: Viola Berman  MRN: [id number]  Today's Date: 9/27/2024  Time Calculation  Start Time: 1005  Stop Time: 1047  Time Calculation (min): 42 min  PT Evaluation Time Entry  PT Evaluation (Low) Time Entry: 20  PT Therapeutic Procedures Time Entry  Therapeutic Exercise Time Entry: 15  PT Modalities Time Entry  Hot/Cold Pack Time Entry: 5     Insurance: [place], 75 visits  Plan of Care: 9/25/24 to 12/24/25  Visit #1    Referring MD Cece Burns   Imaging Performed MRI showed mod to severe OA changes in R hip, osteophytosis, fragmentation of the anterosuperior acetabular rim, large subcortical cystic change, secondary MAY    Assessment     Viola Berman is a 68 y.o. referred for R hip pain and lower back pain since a fall in June. She has had issues with the hip before that were improved with injections and a nerve ablation. Patient demonstrates decreased R hip ROM, decreased lumbar ROM, decreased R hip strength, altered gait mechanics, and pain. At this time, patient is limited with walking without assistance, standing for long periods, negotiating steps, sleeping, turning over in bed, performing ADLs and household chores. Patient will benefit from physical therapy services to address stated impairments and improve functional mobility.    Plan  Treatment/Interventions: Cryotherapy, Education/ Instruction, Gait training, Hot pack, Manual therapy, Neuromuscular re-education, Self care/ home management, Therapeutic activities, Therapeutic exercises  PT Plan: Skilled PT  PT Frequency: 1 time per week  Duration: 10 weeks  Onset Date: 06/28/24  Certification Period Start Date: 09/25/24  Certification Period End Date: 12/24/24  Number of Treatments Authorized: 75  Rehab Potential: Fair  Plan of Care Agreement: Patient    Primary diagnosis: Right hip pain  Current Problem  Problem List Items Addressed This Visit             ICD-10-CM    Lower back pain M54.50    Relevant Orders    Follow  Up In Physical Therapy    Primary osteoarthritis of right hip M16.11    Right hip pain - Primary M25.551    Relevant Orders    Follow Up In Physical Therapy     Other Visit Diagnoses         Codes    Tear of right gluteus medius tendon, initial encounter     S76.011A                General:  General  Reason for Referral: R hip, lower back pain  Referred By: mary kay parry  Past Medical History Relevant to Rehab: PRP, steroid injections, femoral obturator nerve ablation  Preferred Learning Style: verbal, visual, written  Precautions:  Precautions  STEADI Fall Risk Score (The score of 4 or more indicates an increased risk of falling): 4  Precautions Comment: current smoker  Vital Signs:       Subjective:  Chief complaints: R hip pain  Onset/Surgery Date: Few years ago, then returned 6/28/24 after fall  Mechanism of Injury: Fell 6/28/24  Previous History: PRP, steroid injections, femoral obturator nerve ablation in February   Personal Factors that may impact care: No     Pain:  Current: 7/10 Best: 2/10 Worst: 9/10   Location: R buttock/thigh pain   Type: burning, achy   Aggravators: sitting for a long time, walking for a long time, standing for awhile, laying on that side, stairs   Alleviators: taking the pressure off, tylenol arthritis, heat/ice, laying down on L side   Numbness/tingling? Yes    Function:   Work/Recreation: Nurse manager\, desk duty    Prior Level: No cane at baseline   Current limitations: sitting, walking, standing, lying on that side, stairs   Condition: Unchanged     Home Situation: house   Stairs: yes   Lives with daughter   No concerns about home set up No   Any falls in the past year Yes, hit by a door at work     Injuries? Yes it caused R hip pain to come back    Fear of falling? No    Sleep:    Getting to sleep Yes   Disturbed Yes   Preferred position(s):      Goals for Therapy:    Decrease pain and move better; international trip next year    Objective   Palpation: Tender R  "SIJ     ROM/Flexibility:    Lumbar  Flexion: 50      Extension: 10, pain      Sidebend R / L: 15 / 15      Rotation R / L: 50% / 50%      Hip R / L Flexion: 90 / 110      Ext Rot: 10 / 35      Int Rot: 25 / 30     Strength R / L:     Hip Flexion:     3+ / 5    Hip Extension:     4 / 5     Hip Abduction:    4- / 5    Hip Adduction:    4 / 5    Knee Extension:   5 / 5    Knee Flexion:       5 / 5    Ankle DF:             5 / 5    Ankle PF:              5 / 5     Neurological: Sensation is intact and symmetrical in BLEs.      Gait: Amb with SPC in L hand, able to take some steps without but R antalgia increases and she demo's forward trunk lean   Special Tests:     Slump R / L : - / -     SLR R / L : - / -      Outcome Measure:    LEFS: 10 / 80       Treatment:  Therapeutic Exercise:   PPT 5\" x 15   SKC w/towel assist, no hold x 10 ea   LTR x 10 ea   Seated lumbar flexion w/hands on knees x 10    MHP x 5 min in sidelying    Goals:  Active       PT Problem       Patient will be independent with home exercise program for home maintenance.        Start:  09/27/24    Expected End:  10/25/24            Pt will be able to ambulate at least 150 feet without assistive device and min gait deviations to indicate improving pain levels and strength in LE.        Start:  09/27/24    Expected End:  10/25/24            Pt will increase R hip strength to at least 4+/5 in weakened muscle groups to improve lumbopelvic stability for weightbearing activities.        Start:  09/27/24    Expected End:  11/26/24            The patient will improve score on LEFS by 15 points to indicate decreased pain and increased functional level.         Start:  09/27/24    Expected End:  11/26/24                             "

## 2024-09-27 ASSESSMENT — ENCOUNTER SYMPTOMS
DEPRESSION: 0
LOSS OF SENSATION IN FEET: 0
OCCASIONAL FEELINGS OF UNSTEADINESS: 1

## 2024-10-01 ENCOUNTER — TREATMENT (OUTPATIENT)
Dept: PHYSICAL THERAPY | Facility: CLINIC | Age: 69
End: 2024-10-01
Payer: COMMERCIAL

## 2024-10-01 DIAGNOSIS — M54.50 CHRONIC LOW BACK PAIN, UNSPECIFIED BACK PAIN LATERALITY, UNSPECIFIED WHETHER SCIATICA PRESENT: ICD-10-CM

## 2024-10-01 DIAGNOSIS — G89.29 CHRONIC LOW BACK PAIN, UNSPECIFIED BACK PAIN LATERALITY, UNSPECIFIED WHETHER SCIATICA PRESENT: ICD-10-CM

## 2024-10-01 DIAGNOSIS — S76.011A TEAR OF RIGHT GLUTEUS MEDIUS TENDON, INITIAL ENCOUNTER: ICD-10-CM

## 2024-10-01 DIAGNOSIS — M25.551 RIGHT HIP PAIN: Primary | ICD-10-CM

## 2024-10-01 DIAGNOSIS — M16.11 PRIMARY OSTEOARTHRITIS OF RIGHT HIP: ICD-10-CM

## 2024-10-01 PROCEDURE — 97110 THERAPEUTIC EXERCISES: CPT | Mod: GP,CQ | Performed by: PHYSICAL THERAPY ASSISTANT

## 2024-10-01 NOTE — PROGRESS NOTES
"Physical Therapy    Physical Therapy Treatment    Patient Name: Viola Berman  MRN: [id number]  Today's Date: 10/1/2024    Time Entry:   Time Calculation  Start Time: 1620  Stop Time: 1705  Time Calculation (min): 45 min     PT Therapeutic Procedures Time Entry  Therapeutic Exercise Time Entry: 40      Insurance : [place]  Authorization /Certification / Visits allowed: 75  Visit 2               Assessment:   Pain-free by the end of the session. Lengthy education/discussion re: anatomy and pain prevention including moving 2-4x/hour , lie down or stand vs sitting , cryotherapy parameters/frequency, not working into pain , use of lumbar roll.     Plan:  Check of location and frequency of HEP. Progress to sagittal plane, when appropriate.        Current Problem    Problem List Items Addressed This Visit             ICD-10-CM    Lower back pain M54.50    Primary osteoarthritis of right hip M16.11    Right hip pain - Primary M25.551     Other Visit Diagnoses         Codes    Tear of right gluteus medius tendon, initial encounter     S76.011A                  Subjective    Reports increased back pain the day after her last visit. Over the weekend, noted R LE radicular pain to lateral ankle.   Precautions  R hip flexion >90 deg  Pt reports hairline R acetabular fx   Pain   6/10 B sides LB    Objective   Uneven hip sway prior to MET, even afterward     Treatments:  Therapeutic Exercise:  Seated lumbar flexion w/hands on knees 2 x 10; no change    PPT 5\" x 15   SKC w/towel assist, no hold x 10 ea; minor decrease in pain    R LTR 3 x 10 ea centralize and decreased to 2.5/10    Supine add/abd 5 sec hold x 10 reps each ; normalized gait    R side lying w/ CP x 5 min; NC         OP EDUCATION:       Goals:  Active       PT Problem       Patient will be independent with home exercise program for home maintenance.        Start:  09/27/24    Expected End:  10/25/24            Pt will be able to ambulate at least 150 feet without " assistive device and min gait deviations to indicate improving pain levels and strength in LE.        Start:  09/27/24    Expected End:  10/25/24            Pt will increase R hip strength to at least 4+/5 in weakened muscle groups to improve lumbopelvic stability for weightbearing activities.        Start:  09/27/24    Expected End:  11/26/24            The patient will improve score on LEFS by 15 points to indicate decreased pain and increased functional level.         Start:  09/27/24    Expected End:  11/26/24

## 2024-10-03 ENCOUNTER — HOSPITAL ENCOUNTER (OUTPATIENT)
Dept: RADIOLOGY | Facility: HOSPITAL | Age: 69
Discharge: HOME | End: 2024-10-03
Payer: COMMERCIAL

## 2024-10-03 ENCOUNTER — OFFICE VISIT (OUTPATIENT)
Dept: PAIN MEDICINE | Facility: HOSPITAL | Age: 69
End: 2024-10-03
Payer: COMMERCIAL

## 2024-10-03 DIAGNOSIS — M54.16 LUMBAR RADICULITIS: ICD-10-CM

## 2024-10-03 DIAGNOSIS — M54.16 LUMBAR RADICULOPATHY: ICD-10-CM

## 2024-10-03 PROCEDURE — 72120 X-RAY BEND ONLY L-S SPINE: CPT

## 2024-10-03 PROCEDURE — 99214 OFFICE O/P EST MOD 30 MIN: CPT | Performed by: ANESTHESIOLOGY

## 2024-10-03 PROCEDURE — 1125F AMNT PAIN NOTED PAIN PRSNT: CPT | Performed by: ANESTHESIOLOGY

## 2024-10-03 PROCEDURE — 1159F MED LIST DOCD IN RCRD: CPT | Performed by: ANESTHESIOLOGY

## 2024-10-03 ASSESSMENT — PAIN SCALES - GENERAL: PAINLEVEL: 7

## 2024-10-06 NOTE — PROGRESS NOTES
Chief Complaint   Patient presents with    Leg Pain    Back Pain        HPI   Ms. Berman is a 68-year-old female with a history of hip pain as well as low back and leg pain.  The patient was last seen in February of this year at which time she underwent a right sided femoral obturator radiofrequency ablation which she says alleviated her pain more than 75 or 80% and she was able to travel and function without difficulty.  She could walk and had significantly less pain after her radiofrequency ablation.  It wore off and on 8/29/2024 she underwent a right sided hip injection with sports medicine.  She was referred back to my clinic for evaluation of back and leg pain.  The patient sustained a fall on June 28 which she said brought on this pain.  The patient notes that since that time she has had right sided hip and leg pain.  The pain radiates down the lower extremity to the foot/ankle but mainly goes down to the knee.  Pain is mostly in a posterior distribution in the leg.  The pain is throbbing, aching, sharp, burning.  She notes the pain is worse with sitting, standing, walking.  She did get a vascular ultrasound which was negative and has had a couple sessions of physical therapy which are limited by pain.  She tried anti-inflammatories and Tylenol over the past few months.    ROS: 13 point review of systems is complete and is negative listed above in HPI    Past Medical History:   Diagnosis Date    Personal history of other infectious and parasitic diseases     History of hepatitis C       Past Surgical History:   Procedure Laterality Date    COLONOSCOPY  06/03/2013    Complete Colonoscopy    COLONOSCOPY  02/13/2014    Complete Colonoscopy    CT ANGIO CORONARY ART WITH HEARTFLOW IF SCORE >30%  7/9/2021    CT HEART CORONARY ANGIOGRAM 7/9/2021 [facility]    ESOPHAGOGASTRODUODENOSCOPY  02/27/2014    Diagnostic Esophagogastroduodenoscopy    LAPAROSCOPY DIAGNOSTIC / BIOPSY / ASPIRATION / LYSIS  02/13/2014     Exploratory Laparoscopy    OTHER SURGICAL HISTORY  06/05/2013    Chest Tube Insertion       Family History   Problem Relation Name Age of Onset    Diabetes Mother      Hypertension Mother      Pneumonia Mother      Other (Ischemic heart disease) Mother      Lung cancer Father      Diabetes type I Father      Other (AIDS) Brother      Diabetes Brother      Hypertension Brother      Other (Stroke syndrome) Brother      Colon cancer Maternal Grandfather         Social History     Tobacco Use    Smoking status: Every Day     Current packs/day: 0.25     Types: Cigarettes     Passive exposure: Never    Smokeless tobacco: Former   Substance Use Topics    Alcohol use: Never    Drug use: Never       Current Outpatient Medications on File Prior to Visit   Medication Sig Dispense Refill    albuterol 90 mcg/actuation inhaler Inhale 2 puffs every 4 hours if needed for wheezing. 18 g 3    dilTIAZem CD (Cardizem CD) 120 mg 24 hr capsule Take 1 capsule (120 mg) by mouth once daily. 30 capsule 11    eszopiclone (Lunesta) 2 mg tablet Take 1 tablet (2 mg) by mouth as needed at bedtime for sleep. 90 tablet 0    omeprazole (PriLOSEC) 40 mg DR capsule Take 1 capsule (40 mg) by mouth once daily. 90 capsule 1    rosuvastatin (Crestor) 10 mg tablet Take 1 tablet (10 mg) by mouth once daily. 90 tablet 1     No current facility-administered medications on file prior to visit.        No Known Allergies       Imaging:  Narrative & Impression   Interpreted By:  Fany Fitzpatrick,   STUDY:  Lumbar Spine, 4 views.      INDICATION:  Signs/Symptoms:pain.      COMPARISON:  06/28/2019.      ACCESSION NUMBER(S):  [id number]      ORDERING CLINICIAN:  JOSE MOROCHO      FINDINGS:  Alignment is within normal limits.  Disc heights are well preserved.  Mild facet joint arthropathy from L3-4 to L5-S1.  Abdominal aorta atherosclerosis.      No dynamic instability on flexion/extension views.  Vertebral body heights are preserved. Posterior elements are  intact.      IMPRESSION:  1. Mild facet joint arthropathy from L3-4 to L5-S1      MACRO:  None.     Narrative & Impression   Interpreted By:  Kay Mae,   STUDY:  MR HIP RIGHT WO IV CONTRAST; ;  8/22/2024 9:48 am      INDICATION:  Signs/Symptoms:glute tendon tear. Pain      COMPARISON:  CT 06/28/2024      ACCESSION NUMBER(S):  [id number]      ORDERING CLINICIAN:  DAVON GRANADOS      TECHNIQUE:  Multiplanar multisequence MRI obtained of the right hip.      FINDINGS:  Right hip demonstrates prominent osteophytosis about the acetabular  rim as well as femoral head neck junction. No flattening of the  femoral head contour. There is mild subcortical cystic change within  the anterosuperior acetabulum with several adjacent subcortical  cysts. Mild osseous fragmentation of the anterosuperior acetabulum  which may reflect subtle remote fracture through the acetabular rim  as opposed to os acetabula formation. Few adjacent foci of best seen  on recent CT largest focus measuring 13 mm. There is severe thinning  of the articular cartilage within the anterosuperior and anterior  acetabulum.      Right femoral head demonstrates moderate osteophytosis about the  femoral head neck junction without flattening of the femoral head  contour. There is no marrow edema within the femoral head. Large  subcortical cystic change within the anterior femoral head neck  junction with several adjacent cysts with cyst measuring 1.2 x 2.1  cm. No reactive edema. The right femoral head and neck demonstrate  normal marrow signal intensity. Proximal right femoral shaft  visualized portions unremarkable      Right iliopsoas tendon is unremarkable. No iliopsoas bursitis.  Gluteus minimus tendon demonstrates mild chronic enthesopathy about  the anterior facet of the greater trochanter. No insertional  tendinitis or focal tendon tear. Gluteus minimus muscle is  unremarkable. Gluteus medius tendon insertion is intact. There is no  insertional  tendinitis or focal tendon tear. Mild peritrochanteric  edema. No trochanteric bursitis demonstrated.      Remainder of the visualized musculature about the right hip  demonstrates no asymmetric atrophy or edema. Right common hamstring  tendon origin demonstrates mild tendinosis.      Osseous pelvis demonstrates no focal marrow edema. Included portions  visualized sacrum is unremarkable. Bilateral SI joints are  unremarkable. Included portions visualized left hip demonstrate mild  osteoarthritic degenerative change.      Included portions visualized soft tissue pelvis demonstrate mild free  fluid within the lower pelvis. Visualized portions of the lower  lumbar spine demonstrate mild degenerative discogenic change.              IMPRESSION:  1. Moderate to severe osteoarthritic degenerative changes of the  right hip with osteophytosis about the acetabular rim as well as  femoral head neck junction. Mild osseous fragmentation of the  anterosuperior acetabular rim. Also, large subcortical cystic change  within the anterior right femoral head neck junction measuring 2.1  cm. Given the osteoarthritic degenerative changes there is secondary  femoroacetabular impingement      2. No evidence for tear of the gluteus tendon insertions.     Physical Exam:  Gen.: No acute distress  Eyes: Pupils are symmetric  ENT: Hearing is grossly intact  Respiratory: No gasping or shortness of breath noted  Cardiovascular: Extremity show no edema   Skin: No rashes or open lesions or ulcers identified on the skin  Musculoskeletal: Limited range of motion in the hip joint on the right side, negative Maryana at this time.  Patient with a positive SLR on right side only, intact strength and normal reflexes in the lower extremities bilaterally.  Neurologic: Cranial nerves II through XII are grossly intact  Psychiatric:  Patient is alert and oriented x3    Impression/Plan:  68-year-old female referred back to the office by sports medicine for  concern for lumbar radiculitis.  Patient has a history and physical consistent with suspected impingement of the spine from age-related degenerative changes with the radicular pain in the right lower extremity.  The patient has had this pain ongoing since a fall in June of this year and despite conservative measures she persist to have pain that is bothersome and can be as high as an 8 out of 10.  She has not had spine imaging but will obtain an x-ray today as she had a traumatic injury and discussed treatment going forward including further physical therapy and continuing exercise program ongoing as well as an epidural injection.  Procedure, risk, benefits, alternatives reviewed with the patient.  If he did not see relief with the aforementioned and a full course of physical therapy, would obtain an MRI of the lumbar spine.    -Plan for L5/S1 intralaminar epidural.  Procedure, risk, benefits, alternatives reviewed with the patient.

## 2024-10-08 ENCOUNTER — APPOINTMENT (OUTPATIENT)
Dept: PRIMARY CARE | Facility: CLINIC | Age: 69
End: 2024-10-08
Payer: COMMERCIAL

## 2024-10-08 VITALS
HEIGHT: 65 IN | BODY MASS INDEX: 22.99 KG/M2 | DIASTOLIC BLOOD PRESSURE: 84 MMHG | SYSTOLIC BLOOD PRESSURE: 155 MMHG | WEIGHT: 138 LBS | HEART RATE: 67 BPM

## 2024-10-08 DIAGNOSIS — K21.9 GASTROESOPHAGEAL REFLUX DISEASE WITHOUT ESOPHAGITIS: ICD-10-CM

## 2024-10-08 DIAGNOSIS — I47.10 PAROXYSMAL SUPRAVENTRICULAR TACHYCARDIA (CMS-HCC): ICD-10-CM

## 2024-10-08 DIAGNOSIS — R19.06 EPIGASTRIC MASS: Primary | ICD-10-CM

## 2024-10-08 DIAGNOSIS — G47.00 INSOMNIA, UNSPECIFIED TYPE: ICD-10-CM

## 2024-10-08 DIAGNOSIS — R00.2 PALPITATIONS: ICD-10-CM

## 2024-10-08 DIAGNOSIS — E78.00 PURE HYPERCHOLESTEROLEMIA: ICD-10-CM

## 2024-10-08 LAB
AMPHETAMINES UR QL SCN: NORMAL
BARBITURATES UR QL SCN: NORMAL
BENZODIAZ UR QL SCN: NORMAL
BZE UR QL SCN: NORMAL
CANNABINOIDS UR QL SCN: NORMAL
FENTANYL+NORFENTANYL UR QL SCN: NORMAL
METHADONE UR QL SCN: NORMAL
OPIATES UR QL SCN: NORMAL
OXYCODONE+OXYMORPHONE UR QL SCN: NORMAL
PCP UR QL SCN: NORMAL

## 2024-10-08 PROCEDURE — 80307 DRUG TEST PRSMV CHEM ANLYZR: CPT

## 2024-10-08 PROCEDURE — 3008F BODY MASS INDEX DOCD: CPT | Performed by: NURSE PRACTITIONER

## 2024-10-08 PROCEDURE — 1160F RVW MEDS BY RX/DR IN RCRD: CPT | Performed by: NURSE PRACTITIONER

## 2024-10-08 PROCEDURE — 99214 OFFICE O/P EST MOD 30 MIN: CPT | Performed by: NURSE PRACTITIONER

## 2024-10-08 PROCEDURE — 1159F MED LIST DOCD IN RCRD: CPT | Performed by: NURSE PRACTITIONER

## 2024-10-08 RX ORDER — OMEPRAZOLE 40 MG/1
40 CAPSULE, DELAYED RELEASE ORAL DAILY
Qty: 90 CAPSULE | Refills: 0 | Status: SHIPPED | OUTPATIENT
Start: 2024-10-08 | End: 2024-10-11

## 2024-10-08 RX ORDER — ROSUVASTATIN CALCIUM 10 MG/1
10 TABLET, COATED ORAL DAILY
Qty: 90 TABLET | Refills: 0 | Status: SHIPPED | OUTPATIENT
Start: 2024-10-08

## 2024-10-08 RX ORDER — ESZOPICLONE 2 MG/1
2 TABLET, FILM COATED ORAL NIGHTLY PRN
Qty: 90 TABLET | Refills: 0 | Status: SHIPPED | OUTPATIENT
Start: 2024-10-08

## 2024-10-08 RX ORDER — DILTIAZEM HYDROCHLORIDE 120 MG/1
120 CAPSULE, COATED, EXTENDED RELEASE ORAL DAILY
Qty: 90 CAPSULE | Refills: 0 | Status: SHIPPED | OUTPATIENT
Start: 2024-10-08

## 2024-10-08 ASSESSMENT — ENCOUNTER SYMPTOMS
OCCASIONAL FEELINGS OF UNSTEADINESS: 1
ABDOMINAL PAIN: 1
BACK PAIN: 1
LOSS OF SENSATION IN FEET: 0
DEPRESSION: 0

## 2024-10-08 ASSESSMENT — PATIENT HEALTH QUESTIONNAIRE - PHQ9
SUM OF ALL RESPONSES TO PHQ9 QUESTIONS 1 AND 2: 0
1. LITTLE INTEREST OR PLEASURE IN DOING THINGS: NOT AT ALL
2. FEELING DOWN, DEPRESSED OR HOPELESS: NOT AT ALL

## 2024-10-08 NOTE — PROGRESS NOTES
"Subjective   Patient ID: Viola Berman is a 68 y.o. female who presents for Follow-up and Abdominal Pain.    Viola presents to the office today with concerns of lump in upper abdomen. Feels lump in upper abdomen; midline. Lump has been present for one month. She denied nausea, vomiting or diarrhea. Lump is tender if she presses on it. Denied any recent heavy lifting. Has history of IBS.     Requesting refill on Eszopiclone for insomnia. Stated medication works well for her insomnia.        Getting back injection per pain management (Dr. Casandra Chris).  Fell June 28. She stated that since that time she has had right sided hip and leg pain.  The pain radiates down the lower extremity to the foot/ankle but mainly goes down to the knee.  Pain is mostly in a posterior distribution in the leg.  The pain is throbbing, aching, sharp, burning.  She notes the pain is worse with sitting, standing, walking.          Back Pain  This is a new problem. The current episode started 1 to 4 weeks ago. The problem has been waxing and waning since onset. The pain is present in the gluteal. The quality of the pain is described as aching and burning. The pain radiates to the right thigh. The pain is at a severity of 7/10. The pain is Worse during the day. The symptoms are aggravated by bending, sitting, standing and twisting. Stiffness is present All day. Associated symptoms include abdominal pain.        Review of Systems   Gastrointestinal:  Positive for abdominal pain. Negative for blood in stool, diarrhea, nausea and rectal pain.   Musculoskeletal:  Positive for back pain.   All other systems reviewed and are negative.      Objective   /84   Pulse 67   Ht 1.651 m (5' 5\")   Wt 62.6 kg (138 lb)   BMI 22.96 kg/m²     Physical Exam  Constitutional:       Appearance: Normal appearance.   HENT:      Head: Normocephalic and atraumatic.      Right Ear: Tympanic membrane normal.      Left Ear: Tympanic membrane normal.      Nose: " Nose normal.      Mouth/Throat:      Mouth: Mucous membranes are moist.      Pharynx: Oropharynx is clear.   Eyes:      Extraocular Movements: Extraocular movements intact.      Conjunctiva/sclera: Conjunctivae normal.      Pupils: Pupils are equal, round, and reactive to light.   Cardiovascular:      Rate and Rhythm: Normal rate and regular rhythm.      Pulses: Normal pulses.      Heart sounds: Normal heart sounds.   Pulmonary:      Effort: Pulmonary effort is normal.      Breath sounds: Normal breath sounds.   Abdominal:      General: Abdomen is flat. Bowel sounds are normal.      Palpations: Abdomen is soft.      Tenderness: There is abdominal tenderness (small palpable mass midline of abdomen.).   Musculoskeletal:         General: Normal range of motion.      Cervical back: Normal range of motion and neck supple.   Skin:     General: Skin is warm and dry.      Capillary Refill: Capillary refill takes less than 2 seconds.   Neurological:      General: No focal deficit present.      Mental Status: She is alert and oriented to person, place, and time. Mental status is at baseline.   Psychiatric:         Mood and Affect: Mood normal.         Behavior: Behavior normal.         Thought Content: Thought content normal.         Judgment: Judgment normal.         Assessment/Plan   Problem List Items Addressed This Visit             ICD-10-CM    GERD (gastroesophageal reflux disease) K21.9    Palpitations R00.2    Relevant Medications    dilTIAZem CD (Cardizem CD) 120 mg 24 hr capsule    Hyperlipidemia E78.5    Relevant Medications    rosuvastatin (Crestor) 10 mg tablet    Insomnia G47.00    Relevant Medications    eszopiclone (Lunesta) 2 mg tablet    Other Relevant Orders    Drug Screen, Urine With Reflex to Confirmation (Completed)     Other Visit Diagnoses         Codes    Epigastric mass    -  Primary R19.06    Relevant Orders    Basic Metabolic Panel    CT abdomen pelvis w IV and oral contrast    Paroxysmal  supraventricular tachycardia (CMS-HCC)     I47.10    Relevant Medications    dilTIAZem CD (Cardizem CD) 120 mg 24 hr capsule        1) Epigastric mass: r/o hernia. CT of abdomen ordered.     2) Insomnia: urine drug screen ordered. Eszopiclone refilled. instructed to avoid caffeine at bedtime along with computer work or watching TV in bed. Avoid large meals before bed. Keep sleep schedule. Warm shower or massage before bedtime may help with sleep along with reading, soft music, breathing exercises or yoga. Recommend you get out of bed if not sleeping.    3) Back and hip pain: agree with getting injection, per pain management.

## 2024-10-10 ENCOUNTER — APPOINTMENT (OUTPATIENT)
Dept: PHYSICAL THERAPY | Facility: CLINIC | Age: 69
End: 2024-10-10
Payer: COMMERCIAL

## 2024-10-11 DIAGNOSIS — K21.9 GASTROESOPHAGEAL REFLUX DISEASE WITHOUT ESOPHAGITIS: ICD-10-CM

## 2024-10-11 RX ORDER — OMEPRAZOLE 40 MG/1
40 CAPSULE, DELAYED RELEASE ORAL DAILY
Qty: 90 CAPSULE | Refills: 0 | Status: SHIPPED | OUTPATIENT
Start: 2024-10-11

## 2024-10-13 ASSESSMENT — ENCOUNTER SYMPTOMS
BLOOD IN STOOL: 0
NAUSEA: 0
DIARRHEA: 0
RECTAL PAIN: 0

## 2024-10-15 ENCOUNTER — LAB (OUTPATIENT)
Dept: LAB | Facility: LAB | Age: 69
End: 2024-10-15
Payer: COMMERCIAL

## 2024-10-15 ENCOUNTER — TREATMENT (OUTPATIENT)
Dept: PHYSICAL THERAPY | Facility: CLINIC | Age: 69
End: 2024-10-15
Payer: COMMERCIAL

## 2024-10-15 DIAGNOSIS — M54.50 CHRONIC LOW BACK PAIN, UNSPECIFIED BACK PAIN LATERALITY, UNSPECIFIED WHETHER SCIATICA PRESENT: ICD-10-CM

## 2024-10-15 DIAGNOSIS — M25.551 RIGHT HIP PAIN: ICD-10-CM

## 2024-10-15 DIAGNOSIS — G89.29 CHRONIC LOW BACK PAIN, UNSPECIFIED BACK PAIN LATERALITY, UNSPECIFIED WHETHER SCIATICA PRESENT: ICD-10-CM

## 2024-10-15 DIAGNOSIS — R19.06 EPIGASTRIC MASS: ICD-10-CM

## 2024-10-15 LAB
ANION GAP SERPL CALC-SCNC: 12 MMOL/L (ref 10–20)
BUN SERPL-MCNC: 15 MG/DL (ref 6–23)
CALCIUM SERPL-MCNC: 9.6 MG/DL (ref 8.6–10.6)
CHLORIDE SERPL-SCNC: 105 MMOL/L (ref 98–107)
CO2 SERPL-SCNC: 27 MMOL/L (ref 21–32)
CREAT SERPL-MCNC: 0.83 MG/DL (ref 0.5–1.05)
EGFRCR SERPLBLD CKD-EPI 2021: 77 ML/MIN/1.73M*2
GLUCOSE SERPL-MCNC: 81 MG/DL (ref 74–99)
POTASSIUM SERPL-SCNC: 4.4 MMOL/L (ref 3.5–5.3)
SODIUM SERPL-SCNC: 140 MMOL/L (ref 136–145)

## 2024-10-15 PROCEDURE — 80048 BASIC METABOLIC PNL TOTAL CA: CPT

## 2024-10-15 PROCEDURE — 36415 COLL VENOUS BLD VENIPUNCTURE: CPT

## 2024-10-15 PROCEDURE — 97110 THERAPEUTIC EXERCISES: CPT | Mod: GP,CQ | Performed by: PHYSICAL THERAPY ASSISTANT

## 2024-10-15 PROCEDURE — 97014 ELECTRIC STIMULATION THERAPY: CPT | Mod: GP,CQ | Performed by: PHYSICAL THERAPY ASSISTANT

## 2024-10-15 NOTE — PROGRESS NOTES
Physical Therapy    Physical Therapy Treatment    Patient Name: Viola Berman  MRN: [id number]  Today's Date: 10/15/2024    Time Entry:   Time Calculation  Start Time: 1625  Stop Time: 1705  Time Calculation (min): 40 min     PT Therapeutic Procedures Time Entry  Therapeutic Exercise Time Entry: 30  PT Modalities Time Entry  E-Stim (Unattended) Time Entry: 15   Insurance : [place]  Authorization /Certification / Visits allowed: 75  Visit 3               Assessment:  Pain decreased by the EOS to 3/10. Suspect a moderate lateral component d/t easily irritated radicular symptoms in standing and in prone. Tolerated prone hips shifted w/ a pillow under her hips and chest. Encouraged her to try a TENS unit while she is tied to her desk at work. Discussed improvised standing desk set ups.     Plan:  Check on pain location and frequency of HEP. Progress to sagittal plane, when appropriate. Progress to SGIS , as tolerated. May need supine flex/rotate.        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   Precautions  R hip flexion >90 deg  Pt reports hairline R acetabular fx   Pain   6/10 R sides LB-->R  LE to glute fold     Objective   Uneven hip sway prior to MET, even afterward     Treatments:  Therapeutic Exercise:    R LTR 3 x 10 ; no change   R side lying  x 5 min; slight decrease                R SGIS x 5 ; increased R LE to knee     Prone over 2 pillows w/hips shifted L (feet walked to L) x 5 min;centralize to low back   Not today:   Supine add/abd 5 sec hold x 10 reps each ; normalized gait     Modality :   ESU hi/sweep 5:5 x 15 min w/ CP, prone over 2 pillows               OP EDUCATION:       Goals:  Active       PT Problem       Patient will be independent with home exercise program for home maintenance.        Start:  09/27/24    Expected  End:  10/25/24            Pt will be able to ambulate at least 150 feet without assistive device and min gait deviations to indicate improving pain levels and strength in LE.        Start:  09/27/24    Expected End:  10/25/24            Pt will increase R hip strength to at least 4+/5 in weakened muscle groups to improve lumbopelvic stability for weightbearing activities.        Start:  09/27/24    Expected End:  11/26/24            The patient will improve score on LEFS by 15 points to indicate decreased pain and increased functional level.         Start:  09/27/24    Expected End:  11/26/24

## 2024-10-21 ENCOUNTER — HOSPITAL ENCOUNTER (OUTPATIENT)
Facility: HOSPITAL | Age: 69
Discharge: HOME | End: 2024-10-21
Payer: COMMERCIAL

## 2024-10-21 VITALS
HEART RATE: 59 BPM | RESPIRATION RATE: 16 BRPM | SYSTOLIC BLOOD PRESSURE: 145 MMHG | BODY MASS INDEX: 23.32 KG/M2 | OXYGEN SATURATION: 100 % | DIASTOLIC BLOOD PRESSURE: 77 MMHG | WEIGHT: 140 LBS | HEIGHT: 65 IN | TEMPERATURE: 98.1 F

## 2024-10-21 DIAGNOSIS — M54.16 LUMBAR RADICULOPATHY: ICD-10-CM

## 2024-10-21 PROCEDURE — 62323 NJX INTERLAMINAR LMBR/SAC: CPT | Performed by: ANESTHESIOLOGY

## 2024-10-21 PROCEDURE — 3700000012 HC SEDATION LEVEL 5+ TIME - INITIAL 15 MINUTES 5/> YEARS

## 2024-10-21 PROCEDURE — 2500000004 HC RX 250 GENERAL PHARMACY W/ HCPCS (ALT 636 FOR OP/ED): Performed by: ANESTHESIOLOGY

## 2024-10-21 PROCEDURE — 2550000001 HC RX 255 CONTRASTS: Performed by: ANESTHESIOLOGY

## 2024-10-21 RX ORDER — MIDAZOLAM HYDROCHLORIDE 1 MG/ML
INJECTION INTRAMUSCULAR; INTRAVENOUS
Status: DISPENSED
Start: 2024-10-21 | End: 2024-10-21

## 2024-10-21 RX ORDER — METHYLPREDNISOLONE ACETATE 40 MG/ML
INJECTION, SUSPENSION INTRA-ARTICULAR; INTRALESIONAL; INTRAMUSCULAR; SOFT TISSUE
Status: COMPLETED | OUTPATIENT
Start: 2024-10-21 | End: 2024-10-21

## 2024-10-21 RX ORDER — LIDOCAINE HYDROCHLORIDE 5 MG/ML
INJECTION, SOLUTION INFILTRATION; INTRAVENOUS
Status: COMPLETED | OUTPATIENT
Start: 2024-10-21 | End: 2024-10-21

## 2024-10-21 RX ORDER — MIDAZOLAM HYDROCHLORIDE 1 MG/ML
INJECTION, SOLUTION INTRAMUSCULAR; INTRAVENOUS
Status: COMPLETED | OUTPATIENT
Start: 2024-10-21 | End: 2024-10-21

## 2024-10-21 RX ORDER — METHYLPREDNISOLONE ACETATE 40 MG/ML
INJECTION, SUSPENSION INTRA-ARTICULAR; INTRALESIONAL; INTRAMUSCULAR; SOFT TISSUE
Status: DISPENSED
Start: 2024-10-21 | End: 2024-10-21

## 2024-10-21 RX ORDER — LIDOCAINE HYDROCHLORIDE 5 MG/ML
INJECTION, SOLUTION INFILTRATION; INTRAVENOUS
Status: DISPENSED
Start: 2024-10-21 | End: 2024-10-21

## 2024-10-21 ASSESSMENT — COLUMBIA-SUICIDE SEVERITY RATING SCALE - C-SSRS
1. IN THE PAST MONTH, HAVE YOU WISHED YOU WERE DEAD OR WISHED YOU COULD GO TO SLEEP AND NOT WAKE UP?: NO
2. HAVE YOU ACTUALLY HAD ANY THOUGHTS OF KILLING YOURSELF?: NO
6. HAVE YOU EVER DONE ANYTHING, STARTED TO DO ANYTHING, OR PREPARED TO DO ANYTHING TO END YOUR LIFE?: NO

## 2024-10-21 ASSESSMENT — PAIN SCALES - GENERAL
PAINLEVEL_OUTOF10: 5 - MODERATE PAIN
PAINLEVEL_OUTOF10: 5 - MODERATE PAIN
PAINLEVEL_OUTOF10: 10 - WORST POSSIBLE PAIN
PAINLEVEL_OUTOF10: 5 - MODERATE PAIN
PAINLEVEL_OUTOF10: 0 - NO PAIN
PAINLEVEL_OUTOF10: 0 - NO PAIN

## 2024-10-21 ASSESSMENT — ENCOUNTER SYMPTOMS: OCCASIONAL FEELINGS OF UNSTEADINESS: 1

## 2024-10-21 ASSESSMENT — PAIN - FUNCTIONAL ASSESSMENT
PAIN_FUNCTIONAL_ASSESSMENT: 0-10
PAIN_FUNCTIONAL_ASSESSMENT: WONG-BAKER FACES
PAIN_FUNCTIONAL_ASSESSMENT: 0-10
PAIN_FUNCTIONAL_ASSESSMENT: 0-10
PAIN_FUNCTIONAL_ASSESSMENT: WONG-BAKER FACES

## 2024-10-21 NOTE — H&P
HISTORY AND PHYSICAL    History Of Present Illness  Viola Berman is a 68 y.o. female presenting with chronic pain here for procedure as stated below. Patient denies any changes to health since the last visit to our clinic.    Past Medical History  Past Medical History:   Diagnosis Date    Personal history of other infectious and parasitic diseases     History of hepatitis C       Surgical History  Past Surgical History:   Procedure Laterality Date    COLONOSCOPY  06/03/2013    Complete Colonoscopy    COLONOSCOPY  02/13/2014    Complete Colonoscopy    CT ANGIO CORONARY ART WITH HEARTFLOW IF SCORE >30%  7/9/2021    CT HEART CORONARY ANGIOGRAM 7/9/2021 [facility]    ESOPHAGOGASTRODUODENOSCOPY  02/27/2014    Diagnostic Esophagogastroduodenoscopy    LAPAROSCOPY DIAGNOSTIC / BIOPSY / ASPIRATION / LYSIS  02/13/2014    Exploratory Laparoscopy    OTHER SURGICAL HISTORY  06/05/2013    Chest Tube Insertion        Social History  She reports that she has been smoking cigarettes. She has never been exposed to tobacco smoke. She has quit using smokeless tobacco. She reports that she does not drink alcohol and does not use drugs.    Family History  Family History   Problem Relation Name Age of Onset    Diabetes Mother      Hypertension Mother      Pneumonia Mother      Other (Ischemic heart disease) Mother      Lung cancer Father      Diabetes type I Father      Other (AIDS) Brother      Diabetes Brother      Hypertension Brother      Other (Stroke syndrome) Brother      Colon cancer Maternal Grandfather          Allergies  Patient has no known allergies.    Review of Systems  12 point ROS done and negative except for the above.     Physical Exam  General: NAD, well groomed, well nourished  Eyes: Non-icteric sclera, EOMI  Ears, Nose, Mouth, and Throat: External ears and nose appear to be without deformity or rash. No lesions or masses noted. Hearing is grossly intact.   Neck: Trachea midline  Respiratory: Nonlabored  breathing   Cardiovascular: No peripheral edema   Skin: No rashes or open lesions/ulcers identified on skin.      Last Recorded Vitals  There were no vitals taken for this visit.    Relevant Results  Current Outpatient Medications   Medication Instructions    albuterol 90 mcg/actuation inhaler 2 puffs, inhalation, Every 4 hours PRN    dilTIAZem CD (CARDIZEM CD) 120 mg, oral, Daily    eszopiclone (LUNESTA) 2 mg, oral, Nightly PRN    omeprazole (PRILOSEC) 40 mg, oral, Daily    rosuvastatin (CRESTOR) 10 mg, oral, Daily         XR hip right with pelvis when performed 2 or 3 views 06/28/2024    Narrative  Interpreted By:  Ritesh Royal,  STUDY:  XR HIP RIGHT WITH PELVIS WHEN PERFORMED 2 OR 3 VIEWS; ;  6/28/2024  7:11 pm    INDICATION:  Signs/Symptoms:fall, told there may be a fracture to the acetabellum.    COMPARISON:  None.    ACCESSION NUMBER(S):  [id number]    ORDERING CLINICIAN:  JOCY HAYEDN    FINDINGS:  Right hip, three views    There is severe joint space narrowing with osteophytosis and  sclerosis in the right hip. No definite fracture seen. There is no  dislocation. Mild degenerative changes of the left    Impression  Advanced right hip osteoarthritis. No definite fracture seen. If  there is persistent concern however consider CT for complete  evaluation      MACRO:  None    Signed by: Ritesh Royal 6/28/2024 7:14 PM  Dictation workstation:   [id number]     No image results found.     No diagnosis found.        Assessment/Plan   Viola Berman is a 68 y.o. female presenting with chronic pain here for Lumbar interlaminar epidural steroid injection at the L5-S1 level; she denies any recent antibiotic use or infections, she denies any blood thinner use , and she denies contrast or local anesthetic allergies.    ASA PS Classification: 3  Mallampati score: 2      Plan:  - We will proceed with the procedure as above. We discussed extensively the risks, benefits, and alternatives to the procedure. The  patient's questions were addressed and answered in detail. The patient demonstrated understanding of the procedure, and is amenable to proceeding with it. The Risks of the procedure that were discussed with the patient include but are not limited to the following: A lack of efficacy, transient worsening of pain, bleeding, infection, nerve injury, nerve damage, neuritis or sunburn sensation. Informed consent obtained as attached to EMR.  - Patient to continue physician directed home exercises as tolerated.  - Patient will follow-up with us in clinic.      Mauri Ibarra MD  Chronic Pain Fellow  [facility]

## 2024-10-21 NOTE — DISCHARGE INSTRUCTIONS
DISCHARGE INSTRUCTIONS FOR INJECTIONS     You underwent Lumbar interlaminar epidural steroid injection at the L5-S1 level today    After most injections, it is recommended that you relax and limit your activity for the remainder of the day unless you have been told otherwise by your pain physician.  You should not drive a car, operate machinery, or make important legal decisions unless otherwise directed by your pain physician.  You may resume your normal activity, including exercise, tomorrow.      Keep a written pain diary of how much pain relief you experienced following the injection procedure and the length of time of pain relief you experienced pain relief. Following diagnostic injections like medial branch nerve blocks, sacroiliac joint blocks, stellate ganglion injections and other blocks, it is very important you record the specific amount of pain relief you experienced immediately after the injectionand how long it lasted. Your doctor will ask you for this information at your follow up visit.     For all injections, please keep the injection site dry and inspect the site for a couple of days. You may remove the Band-Aid the day of the injection at any time.     Some discomfort, bruising or slight swelling may occur at the injection site. This is not abnormal if it occurs.  If needed you may:    -Take over the counter medication such as Tylenol or Motrin.   -Apply an ice pack for 30 minutes, 2 to 3 times a day for the first 24 hours.     You may shower today; no soaking baths, hot tubs, whirlpools or swimming pools for two days.      If you are given steroids in your injection, it may take 3-5 days for the steroid medication to take effect. You may notice a worsening of your symptoms for 1-2 days after the injection. This is not abnormal.  You may use acetaminophen, ibuprofen, or prescription medication that your doctor may have prescribed for you if you need to do so.     A few common side effects of  steroids include facial flushing, sweating, restlessness, irritability,difficulty sleeping, increase in blood sugar, and increased blood pressure. If you have diabetes, please monitor your blood sugar at least once a day for at least 5 days. If you have poorly controlled high blood pressure, monitoryour blood pressure for at least 2 days and contact your primary care physician if these numbers are unusually high for you.      If you take aspirin or non-steroidal anti-inflammatory drugs (examples are Motrin, Advil, ibuprofen, Naprosyn, Voltaren, Relafen, etc.) you may restart these this evening, but stop taking it 3 days before your next appointment, unless instructed otherwiseby your physician.      You do not need to discontinue non-aspirin-containing pain medications prior to an injection (examples: Celebrex, tramadol, hydrocodone and acetaminophen).      If you take a blood thinning medication (Coumadin, Lovenox, Fragmin,Ticlid, Plavix, Pradaxa, etc.), please discuss this with your primary care physician/cardiologist and your pain physician. These medications MUST be discontinued before you can have an injection safely, without the risk of uncontrolled bleeding. If these medications are not discontinued for an appropriate period of time, you will not be able to receivean injection. Please adhere to instructions given to you about when to restart your blood thinning medication. If you have any questions please reach out to our team.    If you are taking Coumadin, please have your INR checked the morning of your procedure and bring the result to your appointment unless otherwise instructed. If your INR is over 1.2, your injection may need to be rescheduled to avoid uncontrolled bleeding from the needle placement.     Call [facility]  and ask for Pain Management at [phone] between 8am-4pm Monday - Friday if you are experiencing the following:    If you received an epidural or spinal injection:    -Headache that  doesnot go away with medicine, is worse when sitting or standing up, and is greatly relieved upon lying down.   -Severe pain worse than or different than your baseline pain.   -Chills or fever (101º F or greater).   -Drainage or signs of infection at the injection site     Go directly to the Emergency Department if you are experiencing the following and received an epidural or spinal injection:   -Abrupt weakness or progressive weakness in your legs that starts after you leave the clinic.   -Abrupt severe or worsening numbness in your legs.   -Inability to urinate after the injection or loss of bowel or bladder control without the urge to defecate or urinate.     If you have a clinical question that cannot wait until your next appointment, please call [phone] between 8am-4pm Monday - Friday or send a [facility] message. We do our best to return all non-emergency messages within 24 hours, Monday - Friday. A nurse or physician will return your message. You may also the appropriate nurse below, and they will do their best to answer your questions.  - For Dr. Chris, call [facility] at [phone]  - For Dr. Kim, call [facility] at [phone]  - For Dr. Munroe, call [facility] at [phone]  - For Dr. Saleh, call [facility] at [phone]  - For Dr. Corona, call [facility] at [phone]    If you need to cancel an appointment, please call the scheduling staff at [phone] during normal business hours or leave a message at least 24 hours in advance.     If you are going to be sedated for your next procedure, you MUST have responsible adult who can legally drive accompany you home. You cannot eat or drink for at least eight hours prior to the planned procedure if you are going to receive sedation. You may take your non-blood thinning medications with a small sip of water.

## 2024-10-22 ENCOUNTER — APPOINTMENT (OUTPATIENT)
Dept: PHYSICAL THERAPY | Facility: CLINIC | Age: 69
End: 2024-10-22
Payer: COMMERCIAL

## 2024-10-22 ENCOUNTER — HOSPITAL ENCOUNTER (OUTPATIENT)
Dept: RADIOLOGY | Facility: HOSPITAL | Age: 69
Discharge: HOME | End: 2024-10-22
Payer: COMMERCIAL

## 2024-10-22 DIAGNOSIS — R19.06 EPIGASTRIC MASS: ICD-10-CM

## 2024-10-22 PROCEDURE — 74177 CT ABD & PELVIS W/CONTRAST: CPT

## 2024-10-22 PROCEDURE — A9698 NON-RAD CONTRAST MATERIALNOC: HCPCS | Performed by: NURSE PRACTITIONER

## 2024-10-22 PROCEDURE — 2550000001 HC RX 255 CONTRASTS: Performed by: NURSE PRACTITIONER

## 2024-10-22 PROCEDURE — 74177 CT ABD & PELVIS W/CONTRAST: CPT | Performed by: RADIOLOGY

## 2024-10-23 ENCOUNTER — APPOINTMENT (OUTPATIENT)
Dept: CARDIOLOGY | Facility: CLINIC | Age: 69
End: 2024-10-23
Payer: COMMERCIAL

## 2024-10-23 ENCOUNTER — ANCILLARY PROCEDURE (OUTPATIENT)
Dept: CARDIOLOGY | Facility: CLINIC | Age: 69
End: 2024-10-23
Payer: COMMERCIAL

## 2024-10-23 VITALS
SYSTOLIC BLOOD PRESSURE: 124 MMHG | WEIGHT: 136 LBS | HEART RATE: 75 BPM | OXYGEN SATURATION: 96 % | BODY MASS INDEX: 22.66 KG/M2 | HEIGHT: 65 IN | DIASTOLIC BLOOD PRESSURE: 80 MMHG

## 2024-10-23 DIAGNOSIS — I47.10 PAROXYSMAL SUPRAVENTRICULAR TACHYCARDIA (CMS-HCC): Primary | ICD-10-CM

## 2024-10-23 DIAGNOSIS — R07.89 ATYPICAL CHEST PAIN: ICD-10-CM

## 2024-10-23 DIAGNOSIS — I31.39 PERICARDIAL EFFUSION (HHS-HCC): ICD-10-CM

## 2024-10-23 DIAGNOSIS — I47.10 PAROXYSMAL SUPRAVENTRICULAR TACHYCARDIA (CMS-HCC): ICD-10-CM

## 2024-10-23 DIAGNOSIS — R00.2 PALPITATIONS: ICD-10-CM

## 2024-10-23 LAB — BODY SURFACE AREA: 1.68 M2

## 2024-10-23 PROCEDURE — 93247 EXT ECG>7D<15D SCAN A/R: CPT

## 2024-10-23 PROCEDURE — 4004F PT TOBACCO SCREEN RCVD TLK: CPT | Performed by: STUDENT IN AN ORGANIZED HEALTH CARE EDUCATION/TRAINING PROGRAM

## 2024-10-23 PROCEDURE — 99214 OFFICE O/P EST MOD 30 MIN: CPT | Performed by: STUDENT IN AN ORGANIZED HEALTH CARE EDUCATION/TRAINING PROGRAM

## 2024-10-23 PROCEDURE — G2211 COMPLEX E/M VISIT ADD ON: HCPCS | Performed by: STUDENT IN AN ORGANIZED HEALTH CARE EDUCATION/TRAINING PROGRAM

## 2024-10-23 PROCEDURE — 1126F AMNT PAIN NOTED NONE PRSNT: CPT | Performed by: STUDENT IN AN ORGANIZED HEALTH CARE EDUCATION/TRAINING PROGRAM

## 2024-10-23 PROCEDURE — 1159F MED LIST DOCD IN RCRD: CPT | Performed by: STUDENT IN AN ORGANIZED HEALTH CARE EDUCATION/TRAINING PROGRAM

## 2024-10-23 PROCEDURE — 3008F BODY MASS INDEX DOCD: CPT | Performed by: STUDENT IN AN ORGANIZED HEALTH CARE EDUCATION/TRAINING PROGRAM

## 2024-10-23 ASSESSMENT — ENCOUNTER SYMPTOMS
OCCASIONAL FEELINGS OF UNSTEADINESS: 0
DEPRESSION: 0
LOSS OF SENSATION IN FEET: 0

## 2024-10-23 ASSESSMENT — PATIENT HEALTH QUESTIONNAIRE - PHQ9
SUM OF ALL RESPONSES TO PHQ9 QUESTIONS 1 AND 2: 0
2. FEELING DOWN, DEPRESSED OR HOPELESS: NOT AT ALL
1. LITTLE INTEREST OR PLEASURE IN DOING THINGS: NOT AT ALL

## 2024-10-23 ASSESSMENT — PAIN SCALES - GENERAL: PAINLEVEL_OUTOF10: 0-NO PAIN

## 2024-10-23 NOTE — PROGRESS NOTES
Follow up    HPI:    Viola Berman is a 68 y.o. female with pertinent history of tobacco abuse, COPD, dizziness, family history of premature coronary artery disease, short episodes of paroxysmal supraventricular tachycardia lasting up to 12 beats and ventricular ectopy with a 6 beat run of nonsustained ventricular tachycardia on event monitor performed April 2021, EP study with Dr Jenkins with no evidence of accessory pathway or AVNRT complicated by right groin pain with partial nonocclusive DVT in right common femoral vein on Xarelto 8/4/2022, short episodes of paroxysmal SVT up to 13 beats on event monitor performed September 2022, CT calcium score of 0, no clear ischemia nuclear stress test performed 6/2/2021, coronary CT angiography with heart flow revealing normal coronary anatomy with mild atherosclerosis and no obstructive lesions performed 7/9/2021, low normal LVEF of 50 to 55% with impaired relaxation, moderately dilated left atrium, moderately thickened mitral valve without significant stenosis on echo performed 8/28/2024 presents for follow-up.     She continues to have palpitations although they have improved a bit on diltiazem.  She does note that when she took diltiazem in the morning was making her feel dizzy but she is doing better taking it in the evening.   No exacerbating or relieving factors.  Patient denies chest pain and angina.  Pt denies orthopnea, and paroxysmal nocturnal dyspnea.  Pt denies worsening lower extremity edema.  Pt denies syncope.  No recent falls.  No fever or chills.  No cough.  No change in bowel or bladder habits.  No sick contacts.  No recent travel.    12 point review of systems including (Constitutional, Eyes, ENMT, Respiratory, Cardiac, Gastrointestinal, Neurological, Psychiatric, and Hematologic) was performed and is otherwise negative.    Past medical history reviewed:   has a past medical history of Personal history of other infectious and parasitic diseases and SVT  (supraventricular tachycardia) (CMS-HCC).    Past surgical history reviewed:   has a past surgical history that includes Colonoscopy (06/03/2013); Other surgical history (06/05/2013); Esophagogastroduodenoscopy (02/27/2014); Colonoscopy (02/13/2014); Laparoscopy diagnostic / biopsy / aspiration / lysis (02/13/2014); and CT angio coronary art with heartflow if score >30% (7/9/2021).    Social history reviewed:   reports that she has been smoking cigarettes. She has never been exposed to tobacco smoke. She has quit using smokeless tobacco. She reports that she does not drink alcohol and does not use drugs.     Family history reviewed:    Family History   Problem Relation Name Age of Onset    Diabetes Mother      Hypertension Mother      Pneumonia Mother      Other (Ischemic heart disease) Mother      Lung cancer Father      Diabetes type I Father      Other (AIDS) Brother      Diabetes Brother      Hypertension Brother      Other (Stroke syndrome) Brother      Colon cancer Maternal Grandfather         Allergies reviewed: Patient has no known allergies.     Medications reviewed:   Current Outpatient Medications   Medication Instructions    albuterol 90 mcg/actuation inhaler 2 puffs, inhalation, Every 4 hours PRN    dilTIAZem CD (CARDIZEM CD) 120 mg, oral, Daily    eszopiclone (LUNESTA) 2 mg, oral, Nightly PRN    omeprazole (PRILOSEC) 40 mg, oral, Daily    rosuvastatin (CRESTOR) 10 mg, oral, Daily        Vitals reviewed: Visit Vitals  /80 (BP Location: Left arm, Patient Position: Sitting)   Pulse 75       Physical Exam:   General:  Patient is awake, alert, and oriented.  Patient is in no acute distress.  HEENT:  Pupils equal and reactive.  Normocephalic.  Moist mucosa.    Neck:  No thyromegaly.  Normal Jugular Venous Pressure.  Cardiovascular:  Regular rate and rhythm.  Normal S1 and S2.  1/6 TIFFANY.  Pulmonary:  Clear to auscultation bilaterally.  Abdomen:  Soft. Non-tender.   Non-distended.  Positive bowel  sounds.  Lower Extremities:  2+ pedal pulses. No LE edema.  Neurologic:  Cranial nerves intact.  No focal deficit.   Skin: Skin warm and dry, normal skin turgor.   Psychiatric: Normal affect.    Last Labs:  CBC -      Lab Results   Component Value Date    WBC 5.8 04/16/2024    HGB 13.5 04/16/2024    HCT 41.0 04/16/2024     04/16/2024        CMP-  Lab Results   Component Value Date    GLUCOSE 81 10/15/2024     10/15/2024    K 4.4 10/15/2024     10/15/2024    CO2 27 10/15/2024    ANIONGAP 12 10/15/2024    BUN 15 10/15/2024    CREATININE 0.83 10/15/2024    EGFR 77 10/15/2024    CALCIUM 9.6 10/15/2024    PROT 7.0 04/16/2024    ALBUMIN 4.3 04/16/2024    AST 13 04/16/2024    ALT 8 04/16/2024    ALKPHOS 90 04/16/2024    BILITOT 0.5 04/16/2024        LIPIDS-  Lab Results   Component Value Date    CHOL 137 04/16/2024    TRIG 58 04/16/2024    HDL 55.9 04/16/2024    CHHDL 2.5 04/16/2024    VLDL 12 04/16/2024        OTHERS-  Lab Results   Component Value Date    HGBA1C 5.4 04/16/2024        I personally reviewed the patient's recent vitals, labs, medications, orders, EKGs, pertinent cardiac imaging/ echocardiography and ischemic evaluations including stress testing/ cardiac catheterization.    Assessment and Plan:    Viola Berman is a 68 y.o. female with pertinent history of tobacco abuse, COPD, dizziness, family history of premature coronary artery disease, short episodes of paroxysmal supraventricular tachycardia lasting up to 12 beats and ventricular ectopy with a 6 beat run of nonsustained ventricular tachycardia on event monitor performed April 2021, EP study with Dr Jenkins with no evidence of accessory pathway or AVNRT complicated by right groin pain with partial nonocclusive DVT in right common femoral vein on Xarelto 8/4/2022, short episodes of paroxysmal SVT up to 13 beats on event monitor performed September 2022, CT calcium score of 0, no clear ischemia nuclear stress test performed 6/2/2021,  coronary CT angiography with heart flow revealing normal coronary anatomy with mild atherosclerosis and no obstructive lesions performed 7/9/2021, low normal LVEF of 50 to 55% with impaired relaxation, moderately dilated left atrium, moderately thickened mitral valve without significant stenosis on echo performed 8/28/2024 presents for follow-up.  She continues to have palpitations although they have improved a bit on diltiazem.  She does note that when she took diltiazem in the morning was making her feel dizzy but she is doing better taking it in the evening.      Given the patient's symptoms and in order to further evaluate possible arrhythmias, we will plan to perform an event monitor.      Please continue diltiazem extended release 120 mg once daily in the evening and rosuvastatin 10 mg daily.    Please followup with me in Cardiology clinic within the next 8 weeks.    Please return to clinic sooner or seek emergent care if your symptoms reoccur or worsen.    Thank you for allowing me to participate in their care.  Please feel free to call me with any further questions or concerns.        Evans Castano MD, FACC, [facility] [facility]  Division of Cardiovascular Medicine  System Director, Nuclear Cardiology   Medical Director, [facility] [place], [facility]   Clinical , [facility] [employer] [facility] [employer]  [email or web address]   Office:  [phone]

## 2024-10-23 NOTE — PATIENT INSTRUCTIONS
We will plan to perform an event monitor.      Please continue diltiazem extended release 120 mg once daily in the evening and rosuvastatin 10 mg daily.    Please followup with me in Cardiology clinic within the next 8 weeks.    Please return to clinic sooner or seek emergent care if your symptoms reoccur or worsen.

## 2024-10-29 ENCOUNTER — TREATMENT (OUTPATIENT)
Dept: PHYSICAL THERAPY | Facility: CLINIC | Age: 69
End: 2024-10-29
Payer: COMMERCIAL

## 2024-10-29 DIAGNOSIS — M54.50 CHRONIC LOW BACK PAIN, UNSPECIFIED BACK PAIN LATERALITY, UNSPECIFIED WHETHER SCIATICA PRESENT: ICD-10-CM

## 2024-10-29 DIAGNOSIS — G89.29 CHRONIC LOW BACK PAIN, UNSPECIFIED BACK PAIN LATERALITY, UNSPECIFIED WHETHER SCIATICA PRESENT: ICD-10-CM

## 2024-10-29 DIAGNOSIS — M25.551 RIGHT HIP PAIN: ICD-10-CM

## 2024-10-29 PROCEDURE — 97014 ELECTRIC STIMULATION THERAPY: CPT | Mod: GP,CQ | Performed by: PHYSICAL THERAPY ASSISTANT

## 2024-10-29 PROCEDURE — 97110 THERAPEUTIC EXERCISES: CPT | Mod: GP,CQ | Performed by: PHYSICAL THERAPY ASSISTANT

## 2024-11-05 ENCOUNTER — APPOINTMENT (OUTPATIENT)
Dept: PHYSICAL THERAPY | Facility: CLINIC | Age: 69
End: 2024-11-05
Payer: COMMERCIAL

## 2024-11-08 ENCOUNTER — APPOINTMENT (OUTPATIENT)
Dept: ORTHOPEDIC SURGERY | Facility: HOSPITAL | Age: 69
End: 2024-11-08
Payer: COMMERCIAL

## 2024-11-08 DIAGNOSIS — M16.11 PRIMARY OSTEOARTHRITIS OF RIGHT HIP: Primary | ICD-10-CM

## 2024-11-08 DIAGNOSIS — S32.423A: ICD-10-CM

## 2024-11-08 DIAGNOSIS — M54.16 LUMBAR RADICULOPATHY: ICD-10-CM

## 2024-11-08 PROCEDURE — 99214 OFFICE O/P EST MOD 30 MIN: CPT | Performed by: STUDENT IN AN ORGANIZED HEALTH CARE EDUCATION/TRAINING PROGRAM

## 2024-11-18 NOTE — PROGRESS NOTES
Sports Medicine Office Note    Today's Date:  11/08/2024     HPI: Viola Berman is a 68 y.o. female with history of COPD, hepatitis C, DVT on Xarelto, lumbar radiculopathy, right hip DJD with previous fracture of posterior right acetabulum who presents today for follow-up of right hip pain.  She previously followed with Dr. Tompkins and Dr. Burns for her chronic right hip pain and has had multiple injections into her right hip joint including cortisone and PRP.  States she had some relief following these injections, however developed pain radiating distally in right leg, and was seen by Dr. Casandra Chris with pain management and had right sided femoral/obturator RFA.  She states this helped with her radiculopathic pain, but that intra-articular cortisone injections do help with her right groin pain associated with the hip joint.  She last had right hip joint cortisone injection on 8/29/2024 which she states provided relief.  However, radiculopathic pain returned, and she underwent lumbar interlaminar epidural cortisone injection of L5-S1 on 10/21/2024 and she states she had significant relief following this injection.  Of note, she had been on light duty at work due to her pain, but feels ready to return to work at full capacity.  She denies any new injury/trauma.  Denies any new numbness, tingling, weakness, bowel/bladder incontinence, saddle paresthesias, swelling, redness, warmth, or bruising.  States that she scheduled today's appointment in order to be cleared for full return to work if appropriate.  She has been following with physical therapy.    She has no other complaints.    Physical Examination:   Examination of the right hip:  SKIN: No increased erythema, warmth, rashes, or concerning skin lesions  NEURO: Sensation is intact in the bilateral upper and lower extremities. Strength is grossly 5 out of 5 throughout the bilateral upper and lower extremities, unless noted below.  Positive straight leg  raise.  GAIT: Minimally antalgic.  MSK: Examination of the right hip: Hip range of motion was limited in internal rotation, but pain-free. Scour's and FAIR were negative. Stinchfield was negative. Log roll was positive. Maryana's was negative.  Minimal tenderness over right greater trochanter/gluteal musculature.  Positive Trendelenburg.  No tenderness palpation over ASIS, AIIS, adductor tendons, piriformis, ischial tuberosity. Sondra's was negative.  Hip strength is slightly weak when compared to the opposite hip.    Imaging:  Radiographs of the right hip obtained 6/28/2024 were reviewed and revealed moderate to severe DJD without acute osseous abnormalities.    CT right hip done on 6/28/2024 was reviewed and revealed evidence of possible previous posterior acetabular wall fracture with evidence of DJD and MAY, without acute osseous abnormalities.    MRI right hip done on 8/22/2024 was reviewed and revealed moderate to severe DJD with evidence of MAY.    The studies were reviewed by me personally in the office today.    Problem List Items Addressed This Visit             ICD-10-CM    Lumbar radiculopathy M54.16    Primary osteoarthritis of right hip - Primary M16.11     Other Visit Diagnoses         Codes    Closed fracture of posterior wall of acetabulum, initial encounter     S32.423A          Assessment and Plan:    We reviewed the exam and x-ray findings and discussed the conservative and surgical treatment options. We agreed to continue with conservative management of chronic right hip pain with DJD and lumbar DDD with radiculopathy.  She should keep any scheduled follow-up with pain management.  She may return to work in full capacity at this time.  Encouraged her to continue with physical therapy and discussed the importance of regular home exercises.  Recommended prescription doses of Tylenol and encouraged use of ice or heat as needed for acute flares of pain.  Plan for follow-up as needed if pain worsens or  if any other new concerns arise.  Discussed this plan with the patient who is understanding and agreeable.    **This note was dictated using Dragon speech recognition software and was not corrected for spelling or grammatical errors**.    Omar Freeman,   [facility]

## 2024-11-20 LAB — BODY SURFACE AREA: 1.68 M2

## 2024-11-26 ENCOUNTER — APPOINTMENT (OUTPATIENT)
Dept: ORTHOPEDIC SURGERY | Facility: HOSPITAL | Age: 69
End: 2024-11-26
Payer: COMMERCIAL

## 2024-12-12 ENCOUNTER — APPOINTMENT (OUTPATIENT)
Dept: PRIMARY CARE | Facility: CLINIC | Age: 69
End: 2024-12-12
Payer: COMMERCIAL

## 2024-12-30 ENCOUNTER — APPOINTMENT (OUTPATIENT)
Dept: PRIMARY CARE | Facility: CLINIC | Age: 69
End: 2024-12-30
Payer: COMMERCIAL

## 2025-01-02 ENCOUNTER — APPOINTMENT (OUTPATIENT)
Dept: CARDIOLOGY | Facility: CLINIC | Age: 70
End: 2025-01-02
Payer: COMMERCIAL

## 2025-01-02 VITALS
HEART RATE: 65 BPM | HEIGHT: 65 IN | SYSTOLIC BLOOD PRESSURE: 120 MMHG | WEIGHT: 139 LBS | BODY MASS INDEX: 23.16 KG/M2 | OXYGEN SATURATION: 99 % | DIASTOLIC BLOOD PRESSURE: 70 MMHG

## 2025-01-02 DIAGNOSIS — I47.29 NONSUSTAINED VENTRICULAR TACHYCARDIA (MULTI): ICD-10-CM

## 2025-01-02 DIAGNOSIS — I47.10 PAROXYSMAL SUPRAVENTRICULAR TACHYCARDIA (CMS-HCC): ICD-10-CM

## 2025-01-02 DIAGNOSIS — R00.2 PALPITATIONS: ICD-10-CM

## 2025-01-02 DIAGNOSIS — R07.89 ATYPICAL CHEST PAIN: Primary | ICD-10-CM

## 2025-01-02 DIAGNOSIS — I31.39 PERICARDIAL EFFUSION (HHS-HCC): ICD-10-CM

## 2025-01-02 PROCEDURE — 99214 OFFICE O/P EST MOD 30 MIN: CPT | Performed by: STUDENT IN AN ORGANIZED HEALTH CARE EDUCATION/TRAINING PROGRAM

## 2025-01-02 PROCEDURE — 3008F BODY MASS INDEX DOCD: CPT | Performed by: STUDENT IN AN ORGANIZED HEALTH CARE EDUCATION/TRAINING PROGRAM

## 2025-01-02 PROCEDURE — G2211 COMPLEX E/M VISIT ADD ON: HCPCS | Performed by: STUDENT IN AN ORGANIZED HEALTH CARE EDUCATION/TRAINING PROGRAM

## 2025-01-02 PROCEDURE — 1159F MED LIST DOCD IN RCRD: CPT | Performed by: STUDENT IN AN ORGANIZED HEALTH CARE EDUCATION/TRAINING PROGRAM

## 2025-01-02 RX ORDER — DILTIAZEM HYDROCHLORIDE 120 MG/1
120 CAPSULE, COATED, EXTENDED RELEASE ORAL DAILY
Qty: 90 CAPSULE | Refills: 3 | Status: SHIPPED | OUTPATIENT
Start: 2025-01-02 | End: 2026-01-02

## 2025-01-02 RX ORDER — IBUPROFEN 800 MG/1
800 TABLET ORAL EVERY 8 HOURS PRN
COMMUNITY

## 2025-01-02 NOTE — PATIENT INSTRUCTIONS
Please continue diltiazem extended release 120 mg once daily in the evening and rosuvastatin 10 mg daily.    Please followup with me in Cardiology clinic within the next 6 months.    Please return to clinic sooner or seek emergent care if your symptoms reoccur or worsen.

## 2025-01-02 NOTE — PROGRESS NOTES
Follow up    HPI:    Viola Berman is a 69 y.o. female with pertinent history of tobacco abuse, COPD, dizziness, family history of premature coronary artery disease, short episodes of paroxysmal supraventricular tachycardia lasting up to 12 beats and ventricular ectopy with a 6 beat run of nonsustained ventricular tachycardia on event monitor performed April 2021, EP study with Dr Jenkins with no evidence of accessory pathway or AVNRT complicated by right groin pain with partial nonocclusive DVT in right common femoral vein on Xarelto 8/4/2022, short episodes of paroxysmal SVT up to 13 beats on event monitor performed September 2022, CT calcium score of 0, no clear ischemia nuclear stress test performed 6/2/2021, coronary CT angiography with heart flow revealing normal coronary anatomy with mild atherosclerosis and no obstructive lesions performed 7/9/2021, low normal LVEF of 50 to 55% with impaired relaxation, moderately dilated left atrium, moderately thickened mitral valve without significant stenosis on echo performed 8/28/2024, palpitations with average heart rate of 73 bpm, 112 episodes of nonsustained ventricular tachycardia up to 8 beats, 20 episodes of paroxysmal supraventricular tachycardia up to 7 beats event monitor performed October 2024 presents for follow-up.     She is doing relatively well.  Palpitations have significantly improved.  We reviewed and discussed her recent event monitor as well as the natural history of SVT and nonsustained ventricular tachycardia.  No significant chest discomfort.  We did review her prior CT angiography as well as echocardiogram.  No exacerbating or relieving factors.  Patient denies chest pain and angina.  Pt denies orthopnea, and paroxysmal nocturnal dyspnea.  Pt denies worsening lower extremity edema.  Pt denies syncope.  No recent falls.  No fever or chills.  No cough.  No change in bowel or bladder habits.  No sick contacts.  No recent travel.    12 point review  of systems including (Constitutional, Eyes, ENMT, Respiratory, Cardiac, Gastrointestinal, Neurological, Psychiatric, and Hematologic) was performed and is otherwise negative.    Past medical history reviewed:   has a past medical history of Personal history of other infectious and parasitic diseases and SVT (supraventricular tachycardia) (CMS-HCC).    Past surgical history reviewed:   has a past surgical history that includes Colonoscopy (06/03/2013); Other surgical history (06/05/2013); Esophagogastroduodenoscopy (02/27/2014); Colonoscopy (02/13/2014); Laparoscopy diagnostic / biopsy / aspiration / lysis (02/13/2014); and CT angio coronary art with heartflow if score >30% (7/9/2021).    Social history reviewed:   reports that she has been smoking cigarettes. She has never been exposed to tobacco smoke. She has quit using smokeless tobacco. She reports that she does not drink alcohol and does not use drugs.     Family history reviewed:    Family History   Problem Relation Name Age of Onset    Diabetes Mother      Hypertension Mother      Pneumonia Mother      Other (Ischemic heart disease) Mother      Lung cancer Father      Diabetes type I Father      Other (AIDS) Brother      Diabetes Brother      Hypertension Brother      Other (Stroke syndrome) Brother      Colon cancer Maternal Grandfather         Allergies reviewed: Patient has no known allergies.     Medications reviewed:   Current Outpatient Medications   Medication Instructions    albuterol 90 mcg/actuation inhaler 2 puffs, inhalation, Every 4 hours PRN    dilTIAZem CD (CARDIZEM CD) 120 mg, oral, Daily    eszopiclone (LUNESTA) 2 mg, oral, Nightly PRN    ibuprofen 800 mg, Every 8 hours PRN    omeprazole (PRILOSEC) 40 mg, oral, Daily    rosuvastatin (CRESTOR) 10 mg, oral, Daily        Vitals reviewed: Visit Vitals  /70   Pulse 65       Physical Exam:   General:  Patient is awake, alert, and oriented.  Patient is in no acute distress.  HEENT:  Pupils  equal and reactive.  Normocephalic.  Moist mucosa.    Neck:  No thyromegaly.  Normal Jugular Venous Pressure.  Cardiovascular:  Regular rate and rhythm.  Normal S1 and S2.  1/6 TIFFANY.  Pulmonary:  Clear to auscultation bilaterally.  Abdomen:  Soft. Non-tender.   Non-distended.  Positive bowel sounds.  Lower Extremities:  2+ pedal pulses. No LE edema.  Neurologic:  Cranial nerves intact.  No focal deficit.   Skin: Skin warm and dry, normal skin turgor.   Psychiatric: Normal affect.    Last Labs:  CBC -      Lab Results   Component Value Date    WBC 5.8 04/16/2024    HGB 13.5 04/16/2024    HCT 41.0 04/16/2024     04/16/2024        CMP-  Lab Results   Component Value Date    GLUCOSE 81 10/15/2024     10/15/2024    K 4.4 10/15/2024     10/15/2024    CO2 27 10/15/2024    ANIONGAP 12 10/15/2024    BUN 15 10/15/2024    CREATININE 0.83 10/15/2024    EGFR 77 10/15/2024    CALCIUM 9.6 10/15/2024    PROT 7.0 04/16/2024    ALBUMIN 4.3 04/16/2024    AST 13 04/16/2024    ALT 8 04/16/2024    ALKPHOS 90 04/16/2024    BILITOT 0.5 04/16/2024        LIPIDS-  Lab Results   Component Value Date    CHOL 137 04/16/2024    TRIG 58 04/16/2024    HDL 55.9 04/16/2024    CHHDL 2.5 04/16/2024    VLDL 12 04/16/2024        OTHERS-  Lab Results   Component Value Date    HGBA1C 5.4 04/16/2024        I personally reviewed the patient's recent vitals, labs, medications, orders, EKGs, pertinent cardiac imaging/ echocardiography and ischemic evaluations including stress testing/CT angiography.    Assessment and Plan:    Viola Berman is a 69 y.o. female with pertinent history of tobacco abuse, COPD, dizziness, family history of premature coronary artery disease, short episodes of paroxysmal supraventricular tachycardia lasting up to 12 beats and ventricular ectopy with a 6 beat run of nonsustained ventricular tachycardia on event monitor performed April 2021, EP study with Dr Jenkins with no evidence of accessory pathway or AVNRT  complicated by right groin pain with partial nonocclusive DVT in right common femoral vein on Xarelto 8/4/2022, short episodes of paroxysmal SVT up to 13 beats on event monitor performed September 2022, CT calcium score of 0, no clear ischemia nuclear stress test performed 6/2/2021, coronary CT angiography with heart flow revealing normal coronary anatomy with mild atherosclerosis and no obstructive lesions performed 7/9/2021, low normal LVEF of 50 to 55% with impaired relaxation, moderately dilated left atrium, moderately thickened mitral valve without significant stenosis on echo performed 8/28/2024, palpitations with average heart rate of 73 bpm, 112 episodes of nonsustained ventricular tachycardia up to 8 beats, 20 episodes of paroxysmal supraventricular tachycardia up to 7 beats event monitor performed October 2024 presents for follow-up.  She is doing relatively well.  Palpitations have significantly improved.  We reviewed and discussed her recent event monitor as well as the natural history of SVT and nonsustained ventricular tachycardia.  No significant chest discomfort.  We did review her prior CT angiography as well as echocardiogram.      Please continue diltiazem extended release 120 mg once daily in the evening and rosuvastatin 10 mg daily.    Please followup with me in Cardiology clinic within the next 6 months.    Please return to clinic sooner or seek emergent care if your symptoms reoccur or worsen.    Thank you for allowing me to participate in their care.  Please feel free to call me with any further questions or concerns.        Evans Castano MD, [facility], [facility]  Division of Cardiovascular Medicine  System Director, Nuclear Cardiology   Medical Director, [facility] [place], [facility]   Clinical , [employer]  [email or web address]   Office:  [phone]

## 2025-01-10 ENCOUNTER — APPOINTMENT (OUTPATIENT)
Dept: PRIMARY CARE | Facility: CLINIC | Age: 70
End: 2025-01-10
Payer: COMMERCIAL

## 2025-01-10 VITALS
HEART RATE: 62 BPM | BODY MASS INDEX: 23.82 KG/M2 | WEIGHT: 143 LBS | DIASTOLIC BLOOD PRESSURE: 76 MMHG | HEIGHT: 65 IN | SYSTOLIC BLOOD PRESSURE: 122 MMHG

## 2025-01-10 DIAGNOSIS — F51.01 PRIMARY INSOMNIA: ICD-10-CM

## 2025-01-10 DIAGNOSIS — M16.11 PRIMARY OSTEOARTHRITIS OF RIGHT HIP: Primary | ICD-10-CM

## 2025-01-10 DIAGNOSIS — Z00.00 ENCOUNTER FOR GENERAL ADULT MEDICAL EXAMINATION WITHOUT ABNORMAL FINDINGS: ICD-10-CM

## 2025-01-10 DIAGNOSIS — R00.2 PALPITATIONS: ICD-10-CM

## 2025-01-10 DIAGNOSIS — E78.00 PURE HYPERCHOLESTEROLEMIA: ICD-10-CM

## 2025-01-10 DIAGNOSIS — I47.10 PAROXYSMAL SUPRAVENTRICULAR TACHYCARDIA (CMS-HCC): ICD-10-CM

## 2025-01-10 DIAGNOSIS — F17.210 NICOTINE DEPENDENCE, CIGARETTES, UNCOMPLICATED: ICD-10-CM

## 2025-01-10 DIAGNOSIS — K21.9 GASTROESOPHAGEAL REFLUX DISEASE WITHOUT ESOPHAGITIS: ICD-10-CM

## 2025-01-10 RX ORDER — PNEUMOCOCCAL 20-VALENT CONJUGATE VACCINE 2.2; 2.2; 2.2; 2.2; 2.2; 2.2; 2.2; 2.2; 2.2; 2.2; 2.2; 2.2; 2.2; 2.2; 2.2; 2.2; 4.4; 2.2; 2.2; 2.2 UG/.5ML; UG/.5ML; UG/.5ML; UG/.5ML; UG/.5ML; UG/.5ML; UG/.5ML; UG/.5ML; UG/.5ML; UG/.5ML; UG/.5ML; UG/.5ML; UG/.5ML; UG/.5ML; UG/.5ML; UG/.5ML; UG/.5ML; UG/.5ML; UG/.5ML; UG/.5ML
0.5 INJECTION, SUSPENSION INTRAMUSCULAR ONCE
Qty: 0.5 ML | Refills: 0 | Status: SHIPPED | OUTPATIENT
Start: 2025-01-10 | End: 2025-01-10

## 2025-01-10 RX ORDER — IBUPROFEN 800 MG/1
800 TABLET ORAL EVERY 8 HOURS PRN
Qty: 60 TABLET | Refills: 1 | Status: SHIPPED | OUTPATIENT
Start: 2025-01-10

## 2025-01-10 RX ORDER — ROSUVASTATIN CALCIUM 10 MG/1
10 TABLET, COATED ORAL DAILY
Qty: 90 TABLET | Refills: 0 | Status: SHIPPED | OUTPATIENT
Start: 2025-01-10

## 2025-01-10 RX ORDER — ALBUTEROL SULFATE 90 UG/1
2 INHALANT RESPIRATORY (INHALATION) EVERY 4 HOURS PRN
Qty: 18 G | Refills: 3 | Status: SHIPPED | OUTPATIENT
Start: 2025-01-10

## 2025-01-10 RX ORDER — OMEPRAZOLE 40 MG/1
40 CAPSULE, DELAYED RELEASE ORAL DAILY
Qty: 90 CAPSULE | Refills: 0 | Status: SHIPPED | OUTPATIENT
Start: 2025-01-10

## 2025-01-11 PROBLEM — I47.10 PAROXYSMAL SUPRAVENTRICULAR TACHYCARDIA (CMS-HCC): Status: ACTIVE | Noted: 2025-01-11

## 2025-01-11 PROBLEM — Z00.00 ENCOUNTER FOR GENERAL ADULT MEDICAL EXAMINATION WITHOUT ABNORMAL FINDINGS: Status: ACTIVE | Noted: 2025-01-11

## 2025-01-11 PROBLEM — F17.210 NICOTINE DEPENDENCE, CIGARETTES, UNCOMPLICATED: Status: ACTIVE | Noted: 2025-01-11

## 2025-01-11 NOTE — PROGRESS NOTES
"Assessment and Plan:  Problem List Items Addressed This Visit       GERD (gastroesophageal reflux disease)    Relevant Medications    omeprazole (PriLOSEC) 40 mg DR capsule    Palpitations    Hyperlipidemia    Relevant Medications    rosuvastatin (Crestor) 10 mg tablet    Insomnia    Current Assessment & Plan     Continue Lunesta will call in February for refill         Primary osteoarthritis of right hip - Primary    Relevant Medications    ibuprofen 800 mg tablet    Paroxysmal supraventricular tachycardia (CMS-HCC)    Current Assessment & Plan     Patient will continue diltiazem         Nicotine dependence, cigarettes, uncomplicated    Relevant Orders    CT lung screening low dose    Encounter for general adult medical examination without abnormal findings    Relevant Medications    albuterol 90 mcg/actuation inhaler         HPI:  Patient is here for follow-up taking diltiazem for SVT tolerating that well seeing cardiology  Is on rosuvastatin no arthralgia no myalgia  On Lunesta for insomnia  Has been trying to quit smoking      ROS   Constitutional:  o weight loss. Negative for chills and fever.   HENT: Negative.     Respiratory: Negative.     Cardiovascular: Negative.    Gastrointestinal: Negative.  Negative for nausea and vomiting.   Endocrine: Negative.    Genitourinary: Negative.    Musculoskeletal: Negative.    Skin: Negative.  Negative for rash.   Allergic/Immunologic: Negative.    Neurological: Negative.    Hematological: Negative.    Psychiatric/Behavioral: Negative.     All other systems reviewed and are negative.      /76   Pulse 62   Ht 1.651 m (5' 5\")   Wt 64.9 kg (143 lb)   BMI 23.80 kg/m²   Body mass index is 23.8 kg/m².  Physical Exam    ENT:      Head: Normocephalic and atraumatic.      Right Ear: Tympanic membrane normal.      Left Ear: Tympanic membrane normal.      Nose: Nose normal.      Mouth/Throat:      Mouth: Mucous membranes are moist.   Eyes:      Pupils: Pupils are equal, " round, and reactive to light.   Cardiovascular:      Rate and Rhythm: Normal rate and regular rhythm.      Pulses: Normal pulses.      Heart sounds: Normal heart sounds.   Pulmonary:      Effort: Pulmonary effort is normal.      Breath sounds: Normal breath sounds.   Abdominal:      General: Abdomen is flat. Bowel sounds are normal.      Palpations: Abdomen is soft.   Musculoskeletal:         General: Normal range of motion.      Cervical back: Normal range of motion and neck supple.   Skin:     General: Skin is warm and dry.      Capillary Refill: Capillary refill takes less than 2 seconds.   Neurological:      General: No focal deficit present.      Mental Status: She is alert and oriented to person, place, and time.   Psychiatric:         Mood and Affect: Mood normal.       Active Problem List  Patient Active Problem List   Diagnosis    Abnormal Pap smear of cervix    Acute deep vein thrombosis (DVT) of right lower extremity after procedure (Multi)    Acute pain of left ear    Acute pain of right ear    Acute pain of right shoulder    Acute respiratory infection    Acute sinusitis    ASCUS of cervix with negative high risk HPV    Atypical chest pain    Cervical radiculopathy    Cervicalgia    Cigarette nicotine dependence    Constipation    COPD (chronic obstructive pulmonary disease) (Multi)    Costochondritis    Cough    Cyst, kidney, acquired    Disc degeneration, lumbar    Dizziness    EVANS (dyspnea on exertion)    Dysfunction of right eustachian tube    Effusion of joint, lower leg    Epigastric discomfort    Facial scar    Fatty liver    Fluttering heart    Frequent headaches    GERD (gastroesophageal reflux disease)    Hay fever    Palpitations    Hordeolum internum of right upper eyelid    Hydradenitis    Hyperlipidemia    Infectious lymphocytosis    Insomnia    Joint pain, knee    Left shoulder pain    Left upper quadrant pain    Lower back pain    Lumbar radiculopathy    Mass of axilla    Nausea in adult     Neck pain    NSVT (nonsustained ventricular tachycardia) (Multi)    Pain in left axilla    Pain in right axilla    Pain of right breast    Pap smear abnormality of cervix with LGSIL    Paroxysmal SVT (supraventricular tachycardia) (CMS-HCC)    Primary localized osteoarthritis of knee    Primary osteoarthritis of right hip    Right hip pain    Right inguinal pain    Right knee pain    Right-sided low back pain with sciatica    Shoulder pain    Sinus pressure    Sprain of left shoulder    Stiffness of right hip joint    Thigh DVT (deep venous thrombosis) (Multi)    Urinary frequency    Vaginal discomfort    Ventricular ectopics    Vitamin D deficiency    Paroxysmal supraventricular tachycardia (CMS-HCC)    Nicotine dependence, cigarettes, uncomplicated    Encounter for general adult medical examination without abnormal findings       Comprehensive Medical/Surgical/Social/Family History  Past Medical History:   Diagnosis Date    Personal history of other infectious and parasitic diseases     History of hepatitis C    SVT (supraventricular tachycardia) (CMS-HCC)      Past Surgical History:   Procedure Laterality Date    COLONOSCOPY  06/03/2013    Complete Colonoscopy    COLONOSCOPY  02/13/2014    Complete Colonoscopy    CT ANGIO CORONARY ART WITH HEARTFLOW IF SCORE >30%  7/9/2021    CT HEART CORONARY ANGIOGRAM 7/9/2021 [facility]    ESOPHAGOGASTRODUODENOSCOPY  02/27/2014    Diagnostic Esophagogastroduodenoscopy    LAPAROSCOPY DIAGNOSTIC / BIOPSY / ASPIRATION / LYSIS  02/13/2014    Exploratory Laparoscopy    OTHER SURGICAL HISTORY  06/05/2013    Chest Tube Insertion     Social History     Social History Narrative    Not on file       Allergies and Medications  Patient has no known allergies.  Current Outpatient Medications   Medication Instructions    albuterol 90 mcg/actuation inhaler 2 puffs, inhalation, Every 4 hours PRN    dilTIAZem CD (CARDIZEM CD) 120 mg, oral, Daily    eszopiclone (LUNESTA) 2 mg,  oral, Nightly PRN    ibuprofen 800 mg, oral, Every 8 hours PRN    omeprazole (PRILOSEC) 40 mg, oral, Daily    rosuvastatin (CRESTOR) 10 mg, oral, Daily

## 2025-01-13 ENCOUNTER — APPOINTMENT (OUTPATIENT)
Dept: RADIOLOGY | Facility: CLINIC | Age: 70
End: 2025-01-13
Payer: COMMERCIAL

## 2025-01-14 ENCOUNTER — HOSPITAL ENCOUNTER (OUTPATIENT)
Dept: RADIOLOGY | Facility: CLINIC | Age: 70
Discharge: HOME | End: 2025-01-14
Payer: COMMERCIAL

## 2025-01-14 VITALS — BODY MASS INDEX: 23.84 KG/M2 | HEIGHT: 65 IN | WEIGHT: 143.08 LBS

## 2025-01-14 DIAGNOSIS — Z12.31 ENCOUNTER FOR SCREENING MAMMOGRAM FOR MALIGNANT NEOPLASM OF BREAST: ICD-10-CM

## 2025-01-14 PROCEDURE — 77067 SCR MAMMO BI INCL CAD: CPT

## 2025-01-14 PROCEDURE — 77063 BREAST TOMOSYNTHESIS BI: CPT | Performed by: STUDENT IN AN ORGANIZED HEALTH CARE EDUCATION/TRAINING PROGRAM

## 2025-01-14 PROCEDURE — 77067 SCR MAMMO BI INCL CAD: CPT | Performed by: STUDENT IN AN ORGANIZED HEALTH CARE EDUCATION/TRAINING PROGRAM

## 2025-01-27 ENCOUNTER — HOSPITAL ENCOUNTER (OUTPATIENT)
Dept: RADIOLOGY | Facility: CLINIC | Age: 70
Discharge: HOME | End: 2025-01-27
Payer: COMMERCIAL

## 2025-01-27 DIAGNOSIS — F17.210 NICOTINE DEPENDENCE, CIGARETTES, UNCOMPLICATED: ICD-10-CM

## 2025-01-27 PROCEDURE — 71271 CT THORAX LUNG CANCER SCR C-: CPT

## 2025-01-27 PROCEDURE — 71271 CT THORAX LUNG CANCER SCR C-: CPT | Performed by: RADIOLOGY

## 2025-02-12 DIAGNOSIS — G47.00 INSOMNIA, UNSPECIFIED TYPE: ICD-10-CM

## 2025-02-12 RX ORDER — ESZOPICLONE 2 MG/1
2 TABLET, FILM COATED ORAL NIGHTLY PRN
Qty: 90 TABLET | Refills: 0 | Status: SHIPPED | OUTPATIENT
Start: 2025-02-12

## 2025-04-05 DIAGNOSIS — M16.11 PRIMARY OSTEOARTHRITIS OF RIGHT HIP: ICD-10-CM

## 2025-04-06 RX ORDER — IBUPROFEN 800 MG/1
800 TABLET ORAL EVERY 8 HOURS PRN
Qty: 60 TABLET | Refills: 1 | Status: SHIPPED | OUTPATIENT
Start: 2025-04-06

## 2025-05-10 DIAGNOSIS — E78.00 PURE HYPERCHOLESTEROLEMIA: ICD-10-CM

## 2025-05-10 RX ORDER — ROSUVASTATIN CALCIUM 10 MG/1
10 TABLET, COATED ORAL DAILY
Qty: 90 TABLET | Refills: 0 | Status: SHIPPED | OUTPATIENT
Start: 2025-05-10

## 2025-05-11 ASSESSMENT — PROMIS GLOBAL HEALTH SCALE
RATE_AVERAGE_FATIGUE: MILD
RATE_MENTAL_HEALTH: VERY GOOD
EMOTIONAL_PROBLEMS: RARELY
RATE_PHYSICAL_HEALTH: VERY GOOD
CARRYOUT_PHYSICAL_ACTIVITIES: COMPLETELY
CARRYOUT_SOCIAL_ACTIVITIES: VERY GOOD
RATE_GENERAL_HEALTH: VERY GOOD
RATE_QUALITY_OF_LIFE: VERY GOOD
RATE_SOCIAL_SATISFACTION: VERY GOOD
RATE_AVERAGE_PAIN: 4

## 2025-05-13 ENCOUNTER — APPOINTMENT (OUTPATIENT)
Dept: PRIMARY CARE | Facility: CLINIC | Age: 70
End: 2025-05-13
Payer: COMMERCIAL

## 2025-05-13 VITALS
HEART RATE: 60 BPM | SYSTOLIC BLOOD PRESSURE: 129 MMHG | BODY MASS INDEX: 23.13 KG/M2 | WEIGHT: 139 LBS | DIASTOLIC BLOOD PRESSURE: 81 MMHG

## 2025-05-13 DIAGNOSIS — Z12.4 CERVICAL CANCER SCREENING: ICD-10-CM

## 2025-05-13 DIAGNOSIS — Z00.00 ROUTINE GENERAL MEDICAL EXAMINATION AT A HEALTH CARE FACILITY: Primary | ICD-10-CM

## 2025-05-13 DIAGNOSIS — G47.00 INSOMNIA, UNSPECIFIED TYPE: ICD-10-CM

## 2025-05-13 DIAGNOSIS — Z13.1 SCREENING FOR DIABETES MELLITUS: ICD-10-CM

## 2025-05-13 DIAGNOSIS — N89.8 VAGINAL DISCHARGE: ICD-10-CM

## 2025-05-13 DIAGNOSIS — M16.11 PRIMARY OSTEOARTHRITIS OF RIGHT HIP: ICD-10-CM

## 2025-05-13 DIAGNOSIS — K21.9 GASTROESOPHAGEAL REFLUX DISEASE WITHOUT ESOPHAGITIS: ICD-10-CM

## 2025-05-13 DIAGNOSIS — Z00.00 ENCOUNTER FOR GENERAL ADULT MEDICAL EXAMINATION WITHOUT ABNORMAL FINDINGS: ICD-10-CM

## 2025-05-13 DIAGNOSIS — R87.612 PAP SMEAR ABNORMALITY OF CERVIX WITH LGSIL: ICD-10-CM

## 2025-05-13 DIAGNOSIS — R00.2 PALPITATIONS: ICD-10-CM

## 2025-05-13 DIAGNOSIS — I47.10 PAROXYSMAL SUPRAVENTRICULAR TACHYCARDIA: ICD-10-CM

## 2025-05-13 DIAGNOSIS — Z13.220 SCREENING FOR LIPID DISORDERS: ICD-10-CM

## 2025-05-13 DIAGNOSIS — E55.9 VITAMIN D DEFICIENCY: ICD-10-CM

## 2025-05-13 DIAGNOSIS — Z13.29 SCREENING FOR THYROID DISORDER: ICD-10-CM

## 2025-05-13 DIAGNOSIS — E78.00 PURE HYPERCHOLESTEROLEMIA: ICD-10-CM

## 2025-05-13 DIAGNOSIS — Z11.3 SCREEN FOR STD (SEXUALLY TRANSMITTED DISEASE): ICD-10-CM

## 2025-05-13 DIAGNOSIS — R87.619 ABNORMAL CERVICAL PAPANICOLAOU SMEAR, UNSPECIFIED ABNORMAL PAP FINDING: ICD-10-CM

## 2025-05-13 DIAGNOSIS — Z11.8 SCREENING FOR CHLAMYDIAL DISEASE: ICD-10-CM

## 2025-05-13 DIAGNOSIS — E53.8 B12 DEFICIENCY: ICD-10-CM

## 2025-05-13 PROCEDURE — 88175 CYTOPATH C/V AUTO FLUID REDO: CPT

## 2025-05-13 PROCEDURE — 87626 HPV SEP HI-RSK TYP&POOL RSLT: CPT

## 2025-05-13 PROCEDURE — 1159F MED LIST DOCD IN RCRD: CPT | Performed by: FAMILY MEDICINE

## 2025-05-13 PROCEDURE — 99397 PER PM REEVAL EST PAT 65+ YR: CPT | Performed by: FAMILY MEDICINE

## 2025-05-13 RX ORDER — ESZOPICLONE 2 MG/1
2 TABLET, FILM COATED ORAL NIGHTLY PRN
Qty: 90 TABLET | Refills: 0 | Status: SHIPPED | OUTPATIENT
Start: 2025-05-13

## 2025-05-13 RX ORDER — OMEPRAZOLE 40 MG/1
40 CAPSULE, DELAYED RELEASE ORAL DAILY
Qty: 90 CAPSULE | Refills: 0 | Status: SHIPPED | OUTPATIENT
Start: 2025-05-13

## 2025-05-13 RX ORDER — ROSUVASTATIN CALCIUM 10 MG/1
10 TABLET, COATED ORAL DAILY
Qty: 90 TABLET | Refills: 0 | Status: SHIPPED | OUTPATIENT
Start: 2025-05-13

## 2025-05-13 RX ORDER — ALBUTEROL SULFATE 90 UG/1
2 INHALANT RESPIRATORY (INHALATION) EVERY 4 HOURS PRN
Qty: 18 G | Refills: 3 | Status: SHIPPED | OUTPATIENT
Start: 2025-05-13

## 2025-05-13 RX ORDER — DILTIAZEM HYDROCHLORIDE 120 MG/1
120 CAPSULE, COATED, EXTENDED RELEASE ORAL DAILY
Qty: 90 CAPSULE | Refills: 1 | Status: SHIPPED | OUTPATIENT
Start: 2025-05-13 | End: 2026-05-13

## 2025-05-13 RX ORDER — IBUPROFEN 800 MG/1
800 TABLET, FILM COATED ORAL EVERY 8 HOURS PRN
Qty: 60 TABLET | Refills: 1 | Status: SHIPPED | OUTPATIENT
Start: 2025-05-13

## 2025-05-13 ASSESSMENT — ENCOUNTER SYMPTOMS
DEPRESSION: 0
OCCASIONAL FEELINGS OF UNSTEADINESS: 0
LOSS OF SENSATION IN FEET: 0

## 2025-05-16 LAB
BV SCORE VAG QL: NORMAL
C TRACH RRNA SPEC QL NAA+PROBE: NOT DETECTED
N GONORRHOEA RRNA SPEC QL NAA+PROBE: NOT DETECTED
QUEST GC CT AMPLIFIED (ALWAYS MESSAGE): NORMAL

## 2025-05-18 PROBLEM — Z13.1 SCREENING FOR DIABETES MELLITUS: Status: ACTIVE | Noted: 2025-05-18

## 2025-05-18 PROBLEM — Z00.00 ROUTINE GENERAL MEDICAL EXAMINATION AT A HEALTH CARE FACILITY: Status: ACTIVE | Noted: 2025-05-18

## 2025-05-18 PROBLEM — N89.8 VAGINAL DISCHARGE: Status: ACTIVE | Noted: 2025-05-18

## 2025-05-18 PROBLEM — Z12.4 CERVICAL CANCER SCREENING: Status: ACTIVE | Noted: 2025-05-18

## 2025-05-18 PROBLEM — Z11.3 SCREEN FOR STD (SEXUALLY TRANSMITTED DISEASE): Status: ACTIVE | Noted: 2025-05-18

## 2025-05-18 PROBLEM — Z13.220 SCREENING FOR LIPID DISORDERS: Status: ACTIVE | Noted: 2025-05-18

## 2025-05-18 PROBLEM — Z13.29 SCREENING FOR THYROID DISORDER: Status: ACTIVE | Noted: 2025-05-18

## 2025-05-18 PROBLEM — Z11.8 SCREENING FOR CHLAMYDIAL DISEASE: Status: ACTIVE | Noted: 2025-05-18

## 2025-05-18 PROBLEM — E53.8 B12 DEFICIENCY: Status: ACTIVE | Noted: 2025-05-18

## 2025-05-18 LAB
25(OH)D3+25(OH)D2 SERPL-MCNC: 28 NG/ML (ref 30–100)
ALBUMIN SERPL-MCNC: 4.2 G/DL (ref 3.6–5.1)
ALP SERPL-CCNC: 106 U/L (ref 37–153)
ALT SERPL-CCNC: 6 U/L (ref 6–29)
ANION GAP SERPL CALCULATED.4IONS-SCNC: 9 MMOL/L (CALC) (ref 7–17)
AST SERPL-CCNC: 12 U/L (ref 10–35)
BILIRUB SERPL-MCNC: 0.4 MG/DL (ref 0.2–1.2)
BUN SERPL-MCNC: 16 MG/DL (ref 7–25)
CALCIUM SERPL-MCNC: 9.5 MG/DL (ref 8.6–10.4)
CHLORIDE SERPL-SCNC: 106 MMOL/L (ref 98–110)
CHOLEST SERPL-MCNC: 196 MG/DL
CHOLEST/HDLC SERPL: 2.9 (CALC)
CO2 SERPL-SCNC: 26 MMOL/L (ref 20–32)
CREAT SERPL-MCNC: 0.98 MG/DL (ref 0.5–1.05)
EGFRCR SERPLBLD CKD-EPI 2021: 62 ML/MIN/1.73M2
ERYTHROCYTE [DISTWIDTH] IN BLOOD BY AUTOMATED COUNT: 13.9 % (ref 11–15)
EST. AVERAGE GLUCOSE BLD GHB EST-MCNC: 126 MG/DL
EST. AVERAGE GLUCOSE BLD GHB EST-SCNC: 7 MMOL/L
GLUCOSE SERPL-MCNC: 96 MG/DL (ref 65–99)
HBA1C MFR BLD: 6 %
HCT VFR BLD AUTO: 48.7 % (ref 35–45)
HCV AB SERPL QL IA: REACTIVE
HCV RNA SERPL NAA+PROBE-ACNC: ABNORMAL IU/ML
HCV RNA SERPL NAA+PROBE-LOG IU: ABNORMAL LOG IU/ML
HDLC SERPL-MCNC: 67 MG/DL
HGB BLD-MCNC: 15.7 G/DL (ref 11.7–15.5)
HIV 1+2 AB+HIV1 P24 AG SERPL QL IA: NORMAL
LDLC SERPL CALC-MCNC: 112 MG/DL (CALC)
MCH RBC QN AUTO: 30.5 PG (ref 27–33)
MCHC RBC AUTO-ENTMCNC: 32.2 G/DL (ref 32–36)
MCV RBC AUTO: 94.7 FL (ref 80–100)
NONHDLC SERPL-MCNC: 129 MG/DL (CALC)
PLATELET # BLD AUTO: 259 THOUSAND/UL (ref 140–400)
PMV BLD REES-ECKER: 9.2 FL (ref 7.5–12.5)
POTASSIUM SERPL-SCNC: 4.2 MMOL/L (ref 3.5–5.3)
PROT SERPL-MCNC: 7 G/DL (ref 6.1–8.1)
QUEST HCV PCR (ALWAYS MESSAGE): ABNORMAL
RBC # BLD AUTO: 5.14 MILLION/UL (ref 3.8–5.1)
SODIUM SERPL-SCNC: 141 MMOL/L (ref 135–146)
T PALLIDUM AB SER QL IA: NORMAL
TRIGL SERPL-MCNC: 84 MG/DL
TSH SERPL-ACNC: 1.3 MIU/L (ref 0.4–4.5)
VIT B12 SERPL-MCNC: 283 PG/ML (ref 200–1100)
WBC # BLD AUTO: 6.1 THOUSAND/UL (ref 3.8–10.8)

## 2025-05-18 NOTE — PROGRESS NOTES
Reason for Visit: Annual Physical Exam    HPI:Viola is a 68 year old female who presents to the office today for a physical exam. She has been feeling well. H  Had an abnormal Pap needs Pap smear  Hypertension taking medication  Hyperlipidemia is on statin  Up-to-date colonoscopy mammogram      Active Problem List  Patient Active Problem List   Diagnosis    Abnormal Pap smear of cervix    Acute deep vein thrombosis (DVT) of right lower extremity after procedure    Acute pain of left ear    Acute pain of right ear    Acute pain of right shoulder    Acute respiratory infection    Acute sinusitis    ASCUS of cervix with negative high risk HPV    Atypical chest pain    Cervical radiculopathy    Cervicalgia    Cigarette nicotine dependence    Constipation    COPD (chronic obstructive pulmonary disease) (Multi)    Costochondritis    Cough    Cyst, kidney, acquired    Disc degeneration, lumbar    Dizziness    EVANS (dyspnea on exertion)    Dysfunction of right eustachian tube    Effusion of joint, lower leg    Epigastric discomfort    Facial scar    Fatty liver    Fluttering heart    Frequent headaches    GERD (gastroesophageal reflux disease)    Hay fever    Palpitations    Hordeolum internum of right upper eyelid    Hydradenitis    Hyperlipidemia    Infectious lymphocytosis    Insomnia    Joint pain, knee    Left shoulder pain    Left upper quadrant pain    Lower back pain    Lumbar radiculopathy    Mass of axilla    Nausea in adult    Neck pain    NSVT (nonsustained ventricular tachycardia) (Multi)    Pain in left axilla    Pain in right axilla    Pain of right breast    Pap smear abnormality of cervix with LGSIL    Paroxysmal SVT (supraventricular tachycardia) (CMS-HCC)    Primary localized osteoarthritis of knee    Primary osteoarthritis of right hip    Right hip pain    Right inguinal pain    Right knee pain    Right-sided low back pain with sciatica    Shoulder pain    Sinus pressure    Sprain of left shoulder     Stiffness of right hip joint    Thigh DVT (deep venous thrombosis) (Multi)    Urinary frequency    Vaginal discomfort    Ventricular ectopics    Vitamin D deficiency    Paroxysmal supraventricular tachycardia (CMS-HCC)    Nicotine dependence, cigarettes, uncomplicated    Encounter for general adult medical examination without abnormal findings    Screening for chlamydial disease    Cervical cancer screening    Routine general medical examination at a health care facility    Screening for diabetes mellitus    Screening for thyroid disorder    Screening for lipid disorders    B12 deficiency    Vaginal discharge    Screen for STD (sexually transmitted disease)       Comprehensive Medical/Surgical/Social/Family History  Past Medical History:   Diagnosis Date    Abnormal ECG     Arrhythmia     Arthritis     Atrial fibrillation (Multi)     COPD (chronic obstructive pulmonary disease) (Multi)     GERD (gastroesophageal reflux disease)     Joint pain     Lumbosacral disc disease 8 14 2024    Osteoarthritis     Personal history of other infectious and parasitic diseases     History of hepatitis C    SVT (supraventricular tachycardia) (CMS-HCC)      Past Surgical History:   Procedure Laterality Date    ABLATION OF DYSRHYTHMIC FOCUS      COLONOSCOPY  06/03/2013    Complete Colonoscopy    COLONOSCOPY  02/13/2014    Complete Colonoscopy    CT ANGIO CORONARY ART WITH HEARTFLOW IF SCORE >30%  07/09/2021    CT HEART CORONARY ANGIOGRAM 7/9/2021 [facility]    ESOPHAGOGASTRODUODENOSCOPY  02/27/2014    Diagnostic Esophagogastroduodenoscopy    LAPAROSCOPY DIAGNOSTIC / BIOPSY / ASPIRATION / LYSIS  02/13/2014    Exploratory Laparoscopy    OTHER SURGICAL HISTORY  06/05/2013    Chest Tube Insertion     Social History     Social History Narrative    Not on file         Allergies and Medications  Patient has no known allergies.  Current Outpatient Medications on File Prior to Visit   Medication Sig Dispense Refill    [DISCONTINUED]  albuterol 90 mcg/actuation inhaler Inhale 2 puffs every 4 hours if needed for wheezing. 18 g 3    [DISCONTINUED] dilTIAZem CD (Cardizem CD) 120 mg 24 hr capsule Take 1 capsule (120 mg) by mouth once daily. 90 capsule 3    [DISCONTINUED] eszopiclone (Lunesta) 2 mg tablet Take 1 tablet (2 mg) by mouth as needed at bedtime for sleep. 90 tablet 0    [DISCONTINUED] ibuprofen 800 mg tablet TAKE 1 TABLET (800 MG) BY MOUTH EVERY 8 HOURS IF NEEDED FOR MILD PAIN (1 - 3). 60 tablet 1    [DISCONTINUED] omeprazole (PriLOSEC) 40 mg DR capsule Take 1 capsule (40 mg) by mouth once daily. 90 capsule 0    [DISCONTINUED] rosuvastatin (Crestor) 10 mg tablet TAKE 1 TABLET BY MOUTH EVERY DAY 90 tablet 0     No current facility-administered medications on file prior to visit.         ROS otherwise negative aside from what was mentioned above in HPI.    Vitals  /81   Pulse 60   Wt 63 kg (139 lb)   BMI 23.13 kg/m²   Body mass index is 23.13 kg/m².  Physical Exam  Gen: Alert, NAD  HEENT:  PERRLA, EOMI, conjunctiva and sclera normal in appearance. External auditory canals/TMs normal; Oral cavity and posterior pharynx without lesions/exudate  Neck:  Supple with FROM; No masses/nodes palpable; Thyroid nontender and without nodules; No JG  Respiratory:  Lungs CTAB  Cardiovascular:  Heart RRR. No M/R/G. Peripheral pulses equal bilaterally  Abdomen:  Soft, nontender, BS present throughout; No R/G/R; No HSM or masses palpated  Extremities:  FROM all extremities; Muscle strength grossly normal with good tone  Neuro:  CN II-XII intact; Reflexes 2+/2+; Gross motor and sensory intact  Skin:  No suspicious lesions present  Breast: No masses, skin lesions or nipple discharges, no axillary lymphadenopathy    Assessment and Plan:  Problem List Items Addressed This Visit       Abnormal Pap smear of cervix    Relevant Orders    Lipid Panel (Completed)    CBC (Completed)    TSH with reflex to Free T4 if abnormal (Completed)    Hemoglobin A1C  (Completed)    Comprehensive Metabolic Panel (Completed)    Vitamin B12 (Completed)    Vitamin D 25-Hydroxy,Total (for eval of Vitamin D levels) (Completed)    THINPREP PAP TEST    HIV 1/2 Antigen/Antibody Screen with Reflex to Confirmation (Completed)    Syphilis Screen with Reflex (Completed)    Hepatitis C antibody (Completed)    Vaginitis Gram Stain For Bacterial Vaginosis + Yeast (Completed)    HPV DNA High Risk With Genotype    GERD (gastroesophageal reflux disease)    Relevant Medications    omeprazole (PriLOSEC) 40 mg DR capsule    Other Relevant Orders    Lipid Panel (Completed)    CBC (Completed)    TSH with reflex to Free T4 if abnormal (Completed)    Hemoglobin A1C (Completed)    Comprehensive Metabolic Panel (Completed)    Vitamin B12 (Completed)    Vitamin D 25-Hydroxy,Total (for eval of Vitamin D levels) (Completed)    THINPREP PAP TEST    HIV 1/2 Antigen/Antibody Screen with Reflex to Confirmation (Completed)    Syphilis Screen with Reflex (Completed)    Hepatitis C antibody (Completed)    Vaginitis Gram Stain For Bacterial Vaginosis + Yeast (Completed)    HPV DNA High Risk With Genotype    Palpitations    Relevant Medications    dilTIAZem CD (Cardizem CD) 120 mg 24 hr capsule    Other Relevant Orders    Lipid Panel (Completed)    CBC (Completed)    TSH with reflex to Free T4 if abnormal (Completed)    Hemoglobin A1C (Completed)    Comprehensive Metabolic Panel (Completed)    Vitamin B12 (Completed)    Vitamin D 25-Hydroxy,Total (for eval of Vitamin D levels) (Completed)    THINPREP PAP TEST    HIV 1/2 Antigen/Antibody Screen with Reflex to Confirmation (Completed)    Syphilis Screen with Reflex (Completed)    Hepatitis C antibody (Completed)    Vaginitis Gram Stain For Bacterial Vaginosis + Yeast (Completed)    HPV DNA High Risk With Genotype    Hyperlipidemia    Relevant Medications    rosuvastatin (Crestor) 10 mg tablet    Other Relevant Orders    Lipid Panel (Completed)    CBC (Completed)    TSH  with reflex to Free T4 if abnormal (Completed)    Hemoglobin A1C (Completed)    Comprehensive Metabolic Panel (Completed)    Vitamin B12 (Completed)    Vitamin D 25-Hydroxy,Total (for eval of Vitamin D levels) (Completed)    THINPREP PAP TEST    HIV 1/2 Antigen/Antibody Screen with Reflex to Confirmation (Completed)    Syphilis Screen with Reflex (Completed)    Hepatitis C antibody (Completed)    Vaginitis Gram Stain For Bacterial Vaginosis + Yeast (Completed)    HPV DNA High Risk With Genotype    Insomnia    Relevant Medications    eszopiclone (Lunesta) 2 mg tablet    Other Relevant Orders    Lipid Panel (Completed)    CBC (Completed)    TSH with reflex to Free T4 if abnormal (Completed)    Hemoglobin A1C (Completed)    Comprehensive Metabolic Panel (Completed)    Vitamin B12 (Completed)    Vitamin D 25-Hydroxy,Total (for eval of Vitamin D levels) (Completed)    THINPREP PAP TEST    HIV 1/2 Antigen/Antibody Screen with Reflex to Confirmation (Completed)    Syphilis Screen with Reflex (Completed)    Hepatitis C antibody (Completed)    Vaginitis Gram Stain For Bacterial Vaginosis + Yeast (Completed)    HPV DNA High Risk With Genotype    Pap smear abnormality of cervix with LGSIL    Relevant Orders    Lipid Panel (Completed)    CBC (Completed)    TSH with reflex to Free T4 if abnormal (Completed)    Hemoglobin A1C (Completed)    Comprehensive Metabolic Panel (Completed)    Vitamin B12 (Completed)    Vitamin D 25-Hydroxy,Total (for eval of Vitamin D levels) (Completed)    THINPREP PAP TEST    HIV 1/2 Antigen/Antibody Screen with Reflex to Confirmation (Completed)    Syphilis Screen with Reflex (Completed)    Hepatitis C antibody (Completed)    Vaginitis Gram Stain For Bacterial Vaginosis + Yeast (Completed)    HPV DNA High Risk With Genotype    Primary osteoarthritis of right hip    Relevant Medications    ibuprofen 800 mg tablet    Other Relevant Orders    Lipid Panel (Completed)    CBC (Completed)    TSH with reflex  to Free T4 if abnormal (Completed)    Hemoglobin A1C (Completed)    Comprehensive Metabolic Panel (Completed)    Vitamin B12 (Completed)    Vitamin D 25-Hydroxy,Total (for eval of Vitamin D levels) (Completed)    THINPREP PAP TEST    HIV 1/2 Antigen/Antibody Screen with Reflex to Confirmation (Completed)    Syphilis Screen with Reflex (Completed)    Hepatitis C antibody (Completed)    Vaginitis Gram Stain For Bacterial Vaginosis + Yeast (Completed)    HPV DNA High Risk With Genotype    Vitamin D deficiency    Relevant Orders    Lipid Panel (Completed)    CBC (Completed)    TSH with reflex to Free T4 if abnormal (Completed)    Hemoglobin A1C (Completed)    Comprehensive Metabolic Panel (Completed)    Vitamin B12 (Completed)    Vitamin D 25-Hydroxy,Total (for eval of Vitamin D levels) (Completed)    THINPREP PAP TEST    HIV 1/2 Antigen/Antibody Screen with Reflex to Confirmation (Completed)    Syphilis Screen with Reflex (Completed)    Hepatitis C antibody (Completed)    Vaginitis Gram Stain For Bacterial Vaginosis + Yeast (Completed)    HPV DNA High Risk With Genotype    Paroxysmal supraventricular tachycardia (CMS-HCC)    Relevant Medications    dilTIAZem CD (Cardizem CD) 120 mg 24 hr capsule    Other Relevant Orders    Lipid Panel (Completed)    CBC (Completed)    TSH with reflex to Free T4 if abnormal (Completed)    Hemoglobin A1C (Completed)    Comprehensive Metabolic Panel (Completed)    Vitamin B12 (Completed)    Vitamin D 25-Hydroxy,Total (for eval of Vitamin D levels) (Completed)    THINPREP PAP TEST    HIV 1/2 Antigen/Antibody Screen with Reflex to Confirmation (Completed)    Syphilis Screen with Reflex (Completed)    Hepatitis C antibody (Completed)    Vaginitis Gram Stain For Bacterial Vaginosis + Yeast (Completed)    HPV DNA High Risk With Genotype    Encounter for general adult medical examination without abnormal findings    Relevant Medications    albuterol 90 mcg/actuation inhaler    Other Relevant  Orders    Lipid Panel (Completed)    CBC (Completed)    TSH with reflex to Free T4 if abnormal (Completed)    Hemoglobin A1C (Completed)    Comprehensive Metabolic Panel (Completed)    Vitamin B12 (Completed)    Vitamin D 25-Hydroxy,Total (for eval of Vitamin D levels) (Completed)    THINPREP PAP TEST    HIV 1/2 Antigen/Antibody Screen with Reflex to Confirmation (Completed)    Syphilis Screen with Reflex (Completed)    Hepatitis C antibody (Completed)    Vaginitis Gram Stain For Bacterial Vaginosis + Yeast (Completed)    HPV DNA High Risk With Genotype    Screening for chlamydial disease    Relevant Orders    Lipid Panel (Completed)    CBC (Completed)    TSH with reflex to Free T4 if abnormal (Completed)    Hemoglobin A1C (Completed)    Comprehensive Metabolic Panel (Completed)    Vitamin B12 (Completed)    Vitamin D 25-Hydroxy,Total (for eval of Vitamin D levels) (Completed)    C. trachomatis / N. gonorrhoeae, Amplified, Urogenital (Completed)    THINPREP PAP TEST    HIV 1/2 Antigen/Antibody Screen with Reflex to Confirmation (Completed)    Syphilis Screen with Reflex (Completed)    Hepatitis C antibody (Completed)    Vaginitis Gram Stain For Bacterial Vaginosis + Yeast (Completed)    HPV DNA High Risk With Genotype    Cervical cancer screening    Relevant Orders    Lipid Panel (Completed)    CBC (Completed)    TSH with reflex to Free T4 if abnormal (Completed)    Hemoglobin A1C (Completed)    Comprehensive Metabolic Panel (Completed)    Vitamin B12 (Completed)    Vitamin D 25-Hydroxy,Total (for eval of Vitamin D levels) (Completed)    THINPREP PAP TEST    HIV 1/2 Antigen/Antibody Screen with Reflex to Confirmation (Completed)    Syphilis Screen with Reflex (Completed)    Hepatitis C antibody (Completed)    Vaginitis Gram Stain For Bacterial Vaginosis + Yeast (Completed)    HPV DNA High Risk With Genotype    Routine general medical examination at a health care facility - Primary    Relevant Orders    Lipid Panel  (Completed)    CBC (Completed)    TSH with reflex to Free T4 if abnormal (Completed)    Hemoglobin A1C (Completed)    Comprehensive Metabolic Panel (Completed)    Vitamin B12 (Completed)    Vitamin D 25-Hydroxy,Total (for eval of Vitamin D levels) (Completed)    THINPREP PAP TEST    HIV 1/2 Antigen/Antibody Screen with Reflex to Confirmation (Completed)    Syphilis Screen with Reflex (Completed)    Hepatitis C antibody (Completed)    Vaginitis Gram Stain For Bacterial Vaginosis + Yeast (Completed)    HPV DNA High Risk With Genotype    Screening for diabetes mellitus    Relevant Orders    Lipid Panel (Completed)    CBC (Completed)    TSH with reflex to Free T4 if abnormal (Completed)    Hemoglobin A1C (Completed)    Comprehensive Metabolic Panel (Completed)    Vitamin B12 (Completed)    Vitamin D 25-Hydroxy,Total (for eval of Vitamin D levels) (Completed)    THINPREP PAP TEST    HIV 1/2 Antigen/Antibody Screen with Reflex to Confirmation (Completed)    Syphilis Screen with Reflex (Completed)    Hepatitis C antibody (Completed)    Vaginitis Gram Stain For Bacterial Vaginosis + Yeast (Completed)    HPV DNA High Risk With Genotype    Screening for thyroid disorder    Relevant Orders    Lipid Panel (Completed)    CBC (Completed)    TSH with reflex to Free T4 if abnormal (Completed)    Hemoglobin A1C (Completed)    Comprehensive Metabolic Panel (Completed)    Vitamin B12 (Completed)    Vitamin D 25-Hydroxy,Total (for eval of Vitamin D levels) (Completed)    THINPREP PAP TEST    HIV 1/2 Antigen/Antibody Screen with Reflex to Confirmation (Completed)    Syphilis Screen with Reflex (Completed)    Hepatitis C antibody (Completed)    Vaginitis Gram Stain For Bacterial Vaginosis + Yeast (Completed)    HPV DNA High Risk With Genotype    Screening for lipid disorders    Relevant Orders    Lipid Panel (Completed)    CBC (Completed)    TSH with reflex to Free T4 if abnormal (Completed)    Hemoglobin A1C (Completed)    Comprehensive  Metabolic Panel (Completed)    Vitamin B12 (Completed)    Vitamin D 25-Hydroxy,Total (for eval of Vitamin D levels) (Completed)    THINPREP PAP TEST    HIV 1/2 Antigen/Antibody Screen with Reflex to Confirmation (Completed)    Syphilis Screen with Reflex (Completed)    Hepatitis C antibody (Completed)    Vaginitis Gram Stain For Bacterial Vaginosis + Yeast (Completed)    HPV DNA High Risk With Genotype    B12 deficiency    Relevant Orders    Lipid Panel (Completed)    CBC (Completed)    TSH with reflex to Free T4 if abnormal (Completed)    Hemoglobin A1C (Completed)    Comprehensive Metabolic Panel (Completed)    Vitamin B12 (Completed)    Vitamin D 25-Hydroxy,Total (for eval of Vitamin D levels) (Completed)    THINPREP PAP TEST    HIV 1/2 Antigen/Antibody Screen with Reflex to Confirmation (Completed)    Syphilis Screen with Reflex (Completed)    Hepatitis C antibody (Completed)    Vaginitis Gram Stain For Bacterial Vaginosis + Yeast (Completed)    HPV DNA High Risk With Genotype    Vaginal discharge    Relevant Orders    Lipid Panel (Completed)    CBC (Completed)    TSH with reflex to Free T4 if abnormal (Completed)    Hemoglobin A1C (Completed)    Comprehensive Metabolic Panel (Completed)    Vitamin B12 (Completed)    Vitamin D 25-Hydroxy,Total (for eval of Vitamin D levels) (Completed)    THINPREP PAP TEST    HIV 1/2 Antigen/Antibody Screen with Reflex to Confirmation (Completed)    Syphilis Screen with Reflex (Completed)    Hepatitis C antibody (Completed)    Vaginitis Gram Stain For Bacterial Vaginosis + Yeast (Completed)    HPV DNA High Risk With Genotype    Screen for STD (sexually transmitted disease)    Relevant Orders    Lipid Panel (Completed)    CBC (Completed)    TSH with reflex to Free T4 if abnormal (Completed)    Hemoglobin A1C (Completed)    Comprehensive Metabolic Panel (Completed)    Vitamin B12 (Completed)    Vitamin D 25-Hydroxy,Total (for eval of Vitamin D levels) (Completed)    THINPREP PAP  TEST    HIV 1/2 Antigen/Antibody Screen with Reflex to Confirmation (Completed)    Syphilis Screen with Reflex (Completed)    Hepatitis C antibody (Completed)    Vaginitis Gram Stain For Bacterial Vaginosis + Yeast (Completed)    HPV DNA High Risk With Genotype   1) Hyperlipidemia: lipids ordered. Watch foods high in cholesterol (fried foods, fast food, processed meats, desserts such as cookies, cake, ice cream, pastries and other sweets). Some foods that are high in cholesterol are healthy additions to your diet (eggs-4/week, cheese, shellfish, pasture raised steak, organ meats such as heart, kidney and liver, sardines and full-fat yogurt). Continue Rosuvastatin.    2) Insomnia: instructed to avoid caffeine at bedtime along with computer work or watching TV in bed. Avoid large meals before bed. Keep sleep schedule. Warm shower or massage before bedtime may help with sleep along with reading, soft music, breathing exercises or yoga. Recommend you get out of bed if not sleeping. Prescription refilled for Eszopiclone.     3) Renal cysts on renal ultrasound: renal ultrasound ordered to evaluate renal cysts.    4) Abnormal pap: pap repeated today.     5) Prediabetes: A1c ordered. Counseled on watching daily carbohydrates (portion control) such as breads, pasta, rice, starchy vegetables and sweets.    6) Nicotine use: Counseled on options of smoking cessation with prescription medications (Chantix and Bupropion). Also discussed Nicotine patches, gum, lozenge and hypnotic therapy. You are interested in hypnosis. I discussed with you that [place] has hypnosis program.  > 3 minutes spent on smoking cessation.

## 2025-05-20 LAB
25(OH)D3+25(OH)D2 SERPL-MCNC: 28 NG/ML (ref 30–100)
ALBUMIN SERPL-MCNC: 4.2 G/DL (ref 3.6–5.1)
ALP SERPL-CCNC: 106 U/L (ref 37–153)
ALT SERPL-CCNC: 6 U/L (ref 6–29)
ANION GAP SERPL CALCULATED.4IONS-SCNC: 9 MMOL/L (CALC) (ref 7–17)
AST SERPL-CCNC: 12 U/L (ref 10–35)
BILIRUB SERPL-MCNC: 0.4 MG/DL (ref 0.2–1.2)
BUN SERPL-MCNC: 16 MG/DL (ref 7–25)
CALCIUM SERPL-MCNC: 9.5 MG/DL (ref 8.6–10.4)
CHLORIDE SERPL-SCNC: 106 MMOL/L (ref 98–110)
CHOLEST SERPL-MCNC: 196 MG/DL
CHOLEST/HDLC SERPL: 2.9 (CALC)
CO2 SERPL-SCNC: 26 MMOL/L (ref 20–32)
CREAT SERPL-MCNC: 0.98 MG/DL (ref 0.5–1.05)
EGFRCR SERPLBLD CKD-EPI 2021: 62 ML/MIN/1.73M2
ERYTHROCYTE [DISTWIDTH] IN BLOOD BY AUTOMATED COUNT: 13.9 % (ref 11–15)
EST. AVERAGE GLUCOSE BLD GHB EST-MCNC: 126 MG/DL
EST. AVERAGE GLUCOSE BLD GHB EST-SCNC: 7 MMOL/L
GLUCOSE SERPL-MCNC: 96 MG/DL (ref 65–99)
HBA1C MFR BLD: 6 %
HCT VFR BLD AUTO: 48.7 % (ref 35–45)
HCV AB SERPL QL IA: REACTIVE
HCV RNA SERPL NAA+PROBE-ACNC: ABNORMAL IU/ML
HCV RNA SERPL NAA+PROBE-LOG IU: ABNORMAL LOG IU/ML
HDLC SERPL-MCNC: 67 MG/DL
HGB BLD-MCNC: 15.7 G/DL (ref 11.7–15.5)
HIV 1+2 AB+HIV1 P24 AG SERPL QL IA: NORMAL
LDLC SERPL CALC-MCNC: 112 MG/DL (CALC)
MCH RBC QN AUTO: 30.5 PG (ref 27–33)
MCHC RBC AUTO-ENTMCNC: 32.2 G/DL (ref 32–36)
MCV RBC AUTO: 94.7 FL (ref 80–100)
NONHDLC SERPL-MCNC: 129 MG/DL (CALC)
PLATELET # BLD AUTO: 259 THOUSAND/UL (ref 140–400)
PMV BLD REES-ECKER: 9.2 FL (ref 7.5–12.5)
POTASSIUM SERPL-SCNC: 4.2 MMOL/L (ref 3.5–5.3)
PROT SERPL-MCNC: 7 G/DL (ref 6.1–8.1)
QUEST HCV PCR (ALWAYS MESSAGE): ABNORMAL
RBC # BLD AUTO: 5.14 MILLION/UL (ref 3.8–5.1)
SODIUM SERPL-SCNC: 141 MMOL/L (ref 135–146)
T PALLIDUM AB SER QL IA: NEGATIVE
TRIGL SERPL-MCNC: 84 MG/DL
TSH SERPL-ACNC: 1.3 MIU/L (ref 0.4–4.5)
VIT B12 SERPL-MCNC: 283 PG/ML (ref 200–1100)
WBC # BLD AUTO: 6.1 THOUSAND/UL (ref 3.8–10.8)

## 2025-05-21 DIAGNOSIS — T75.3XXA MOTION SICKNESS, INITIAL ENCOUNTER: Primary | ICD-10-CM

## 2025-05-21 RX ORDER — SCOPOLAMINE 1 MG/3D
1 PATCH, EXTENDED RELEASE TRANSDERMAL
Qty: 4 PATCH | Refills: 0 | Status: SHIPPED | OUTPATIENT
Start: 2025-05-21 | End: 2025-07-20

## 2025-05-28 LAB
CYTOLOGY CMNT CVX/VAG CYTO-IMP: NORMAL
HPV HR 12 DNA GENITAL QL NAA+PROBE: NEGATIVE
HPV HR GENOTYPES PNL CVX NAA+PROBE: NEGATIVE
HPV16 DNA SPEC QL NAA+PROBE: NEGATIVE
HPV18 DNA SPEC QL NAA+PROBE: NEGATIVE
LAB AP HPV GENOTYPE QUESTION: YES
LAB AP HPV HR: NORMAL
LABORATORY COMMENT REPORT: NORMAL
PATH REPORT.TOTAL CANCER: NORMAL

## 2025-05-29 DIAGNOSIS — N89.8 VAGINAL DISCHARGE: Primary | ICD-10-CM

## 2025-05-29 RX ORDER — METRONIDAZOLE 500 MG/1
500 TABLET ORAL 2 TIMES DAILY
Qty: 14 TABLET | Refills: 0 | Status: SHIPPED | OUTPATIENT
Start: 2025-05-29 | End: 2025-06-05

## 2025-06-02 ENCOUNTER — PATIENT MESSAGE (OUTPATIENT)
Dept: PRIMARY CARE | Facility: CLINIC | Age: 70
End: 2025-06-02
Payer: COMMERCIAL

## 2025-06-17 ENCOUNTER — CLINICAL SUPPORT (OUTPATIENT)
Dept: PRIMARY CARE | Facility: CLINIC | Age: 70
End: 2025-06-17
Payer: COMMERCIAL

## 2025-06-17 ENCOUNTER — APPOINTMENT (OUTPATIENT)
Dept: PRIMARY CARE | Facility: CLINIC | Age: 70
End: 2025-06-17
Payer: COMMERCIAL

## 2025-06-17 PROCEDURE — 90472 IMMUNIZATION ADMIN EACH ADD: CPT | Performed by: FAMILY MEDICINE

## 2025-06-17 PROCEDURE — 90471 IMMUNIZATION ADMIN: CPT | Performed by: FAMILY MEDICINE

## 2025-06-17 PROCEDURE — 90707 MMR VACCINE SC: CPT | Performed by: FAMILY MEDICINE

## 2025-06-17 PROCEDURE — 90746 HEPB VACCINE 3 DOSE ADULT IM: CPT | Performed by: FAMILY MEDICINE

## 2025-07-16 ENCOUNTER — APPOINTMENT (OUTPATIENT)
Dept: CARDIOLOGY | Facility: CLINIC | Age: 70
End: 2025-07-16
Payer: COMMERCIAL

## 2025-07-16 VITALS
HEART RATE: 73 BPM | HEIGHT: 65 IN | BODY MASS INDEX: 22.16 KG/M2 | SYSTOLIC BLOOD PRESSURE: 143 MMHG | WEIGHT: 133 LBS | DIASTOLIC BLOOD PRESSURE: 73 MMHG | OXYGEN SATURATION: 98 %

## 2025-07-16 DIAGNOSIS — R07.89 ATYPICAL CHEST PAIN: ICD-10-CM

## 2025-07-16 DIAGNOSIS — I31.39 PERICARDIAL EFFUSION (HHS-HCC): ICD-10-CM

## 2025-07-16 DIAGNOSIS — R00.2 PALPITATIONS: ICD-10-CM

## 2025-07-16 DIAGNOSIS — I47.10 PAROXYSMAL SUPRAVENTRICULAR TACHYCARDIA: Primary | ICD-10-CM

## 2025-07-16 DIAGNOSIS — I47.29 NONSUSTAINED VENTRICULAR TACHYCARDIA (MULTI): ICD-10-CM

## 2025-07-16 PROCEDURE — 1159F MED LIST DOCD IN RCRD: CPT | Performed by: STUDENT IN AN ORGANIZED HEALTH CARE EDUCATION/TRAINING PROGRAM

## 2025-07-16 PROCEDURE — 99212 OFFICE O/P EST SF 10 MIN: CPT

## 2025-07-16 PROCEDURE — 99214 OFFICE O/P EST MOD 30 MIN: CPT | Performed by: STUDENT IN AN ORGANIZED HEALTH CARE EDUCATION/TRAINING PROGRAM

## 2025-07-16 PROCEDURE — 3008F BODY MASS INDEX DOCD: CPT | Performed by: STUDENT IN AN ORGANIZED HEALTH CARE EDUCATION/TRAINING PROGRAM

## 2025-07-16 NOTE — PROGRESS NOTES
Follow up    HPI:    Viola Berman is a 69 y.o. female with pertinent history of tobacco abuse, COPD, dizziness, family history of premature coronary artery disease, short episodes of paroxysmal supraventricular tachycardia lasting up to 12 beats and ventricular ectopy with a 6 beat run of nonsustained ventricular tachycardia on event monitor performed April 2021, EP study with Dr Jenkins with no evidence of accessory pathway or AVNRT complicated by right groin pain with partial nonocclusive DVT in right common femoral vein on Xarelto 8/4/2022, short episodes of paroxysmal SVT up to 13 beats on event monitor performed September 2022, CT calcium score of 0, no clear ischemia nuclear stress test performed 6/2/2021, coronary CT angiography with heart flow revealing normal coronary anatomy with mild atherosclerosis and no obstructive lesions performed 7/9/2021, low normal LVEF of 50 to 55% with impaired relaxation, moderately dilated left atrium, moderately thickened mitral valve without significant stenosis on echo performed 8/28/2024, palpitations with average heart rate of 73 bpm, 112 episodes of nonsustained ventricular tachycardia up to 8 beats, 20 episodes of paroxysmal supraventricular tachycardia up to 7 beats event monitor performed October 2024 presents for follow-up.     She is doing relatively well.  Palpitations are minimal.  She has had minimal chest discomfort as well.  We reviewed and discussed her prior event monitor as well as the natural history of SVT and nonsustained ventricular tachycardia.  We also reviewed her prior echocardiogram.  No exacerbating or relieving factors.  Patient denies chest pain and angina.  Pt denies orthopnea, and paroxysmal nocturnal dyspnea.  Pt denies worsening lower extremity edema.  Pt denies syncope.  No recent falls.  No fever or chills.  No cough.  No change in bowel or bladder habits.  No sick contacts.  No recent travel.    12 point review of systems including  (Constitutional, Eyes, ENMT, Respiratory, Cardiac, Gastrointestinal, Neurological, Psychiatric, and Hematologic) was performed and is otherwise negative.    Past medical history reviewed:   has a past medical history of Abnormal ECG, Arrhythmia, Arthritis, Atrial fibrillation (Multi), COPD (chronic obstructive pulmonary disease) (Multi), GERD (gastroesophageal reflux disease), Joint pain, Lumbosacral disc disease (8 14 2024), Osteoarthritis, Personal history of other infectious and parasitic diseases, and SVT (supraventricular tachycardia).    She has no past medical history of Personal history of irradiation.    Past surgical history reviewed:   has a past surgical history that includes Colonoscopy (06/03/2013); Other surgical history (06/05/2013); Esophagogastroduodenoscopy (02/27/2014); Colonoscopy (02/13/2014); Laparoscopy diagnostic / biopsy / aspiration / lysis (02/13/2014); CT angio coronary art with heartflow if score >30% (07/09/2021); and Ablation of dysrhythmic focus.    Social history reviewed:   reports that she has been smoking cigarettes. She started smoking about 40 years ago. She has a 20.3 pack-year smoking history. She has never been exposed to tobacco smoke. She has quit using smokeless tobacco. She reports that she does not drink alcohol and does not use drugs.     Family history reviewed:    Family History   Problem Relation Name Age of Onset    Diabetes Mother Marissa     Hypertension Mother Marissa     Pneumonia Mother Marissa     Other (Ischemic heart disease) Mother Marissa     Lung cancer Father Verbena [place]     Diabetes type I Father Keithbandar Martinez     Other (AIDS) Brother Alphonso     Diabetes Brother Alphonso     Hypertension Brother Alphonso     Other (Stroke syndrome) Brother Alphonso     Colon cancer Maternal Grandfather Ulysses        Allergies reviewed: Patient has no known allergies.     Medications reviewed:   Current Outpatient Medications   Medication Instructions    albuterol 90 mcg/actuation  inhaler 2 puffs, inhalation, Every 4 hours PRN    dilTIAZem CD (CARDIZEM CD) 120 mg, oral, Daily    eszopiclone (LUNESTA) 2 mg, oral, Nightly PRN    ibuprofen 800 mg, oral, Every 8 hours PRN    omeprazole (PRILOSEC) 40 mg, oral, Daily    rosuvastatin (CRESTOR) 10 mg, oral, Daily    scopolamine (Transderm-Scop) 1 mg over 3 days patch 3 day 1 patch, transdermal, Every 72 hours PRN        Vitals reviewed: Visit Vitals  /73 (BP Location: Left arm, Patient Position: Sitting, BP Cuff Size: Adult)   Pulse 73       Physical Exam:   General:  Patient is awake, alert, and oriented.  Patient is in no acute distress.  HEENT:  Pupils equal and reactive.  Normocephalic.  Moist mucosa.    Neck:  No thyromegaly.  Normal Jugular Venous Pressure.  Cardiovascular:  Regular rate and rhythm.  Normal S1 and S2.  1/6 TIFFANY.  Pulmonary:  Clear to auscultation bilaterally.  Abdomen:  Soft. Non-tender.   Non-distended.  Positive bowel sounds.  Lower Extremities:  2+ pedal pulses. No LE edema.  Neurologic:  Cranial nerves intact.  No focal deficit.   Skin: Skin warm and dry, normal skin turgor.   Psychiatric: Normal affect.    Last Labs:  CBC -      Lab Results   Component Value Date    WBC 6.1 05/15/2025    HGB 15.7 (H) 05/15/2025    HCT 48.7 (H) 05/15/2025     05/15/2025        CMP-  Lab Results   Component Value Date    GLUCOSE 96 05/15/2025     05/15/2025    K 4.2 05/15/2025     05/15/2025    CO2 26 05/15/2025    ANIONGAP 9 05/15/2025    BUN 16 05/15/2025    CREATININE 0.98 05/15/2025    EGFR 62 05/15/2025    CALCIUM 9.5 05/15/2025    PROT 7.0 05/15/2025    ALBUMIN 4.2 05/15/2025    AST 12 05/15/2025    ALT 6 05/15/2025    ALKPHOS 106 05/15/2025    BILITOT 0.4 05/15/2025        LIPIDS-  Lab Results   Component Value Date    CHOL 196 05/15/2025    TRIG 84 05/15/2025    HDL 67 05/15/2025    CHHDL 2.9 05/15/2025    VLDL 12 04/16/2024        OTHERS-  Lab Results   Component Value Date    HGBA1C 6.0 (H) 05/15/2025         I personally reviewed the patient's recent vitals, labs, medications, orders, EKGs, pertinent cardiac imaging/ echocardiography and ischemic evaluations including stress testing/CT angiography.    Assessment and Plan:    Viola Berman is a 69 y.o. female with pertinent history of tobacco abuse, COPD, dizziness, family history of premature coronary artery disease, short episodes of paroxysmal supraventricular tachycardia lasting up to 12 beats and ventricular ectopy with a 6 beat run of nonsustained ventricular tachycardia on event monitor performed April 2021, EP study with Dr Jenkins with no evidence of accessory pathway or AVNRT complicated by right groin pain with partial nonocclusive DVT in right common femoral vein on Xarelto 8/4/2022, short episodes of paroxysmal SVT up to 13 beats on event monitor performed September 2022, CT calcium score of 0, no clear ischemia nuclear stress test performed 6/2/2021, coronary CT angiography with heart flow revealing normal coronary anatomy with mild atherosclerosis and no obstructive lesions performed 7/9/2021, low normal LVEF of 50 to 55% with impaired relaxation, moderately dilated left atrium, moderately thickened mitral valve without significant stenosis on echo performed 8/28/2024, palpitations with average heart rate of 73 bpm, 112 episodes of nonsustained ventricular tachycardia up to 8 beats, 20 episodes of paroxysmal supraventricular tachycardia up to 7 beats event monitor performed October 2024 presents for follow-up.   She is doing relatively well.  Palpitations are minimal.  She has had minimal chest discomfort as well.  We reviewed and discussed her prior event monitor as well as the natural history of SVT and nonsustained ventricular tachycardia.  We also reviewed her prior echocardiogram.  Her blood pressure is a bit elevated in clinic today but normally is well-controlled.    Please continue diltiazem extended release 120 mg once daily in the evening  and rosuvastatin 10 mg daily.    We will obtain a transthoracic echocardiogram for structural evaluation including ejection fraction, assessment of regional wall motion abnormalities or valvular disease, and further evaluation of hemodynamics prior to follow-up.    Please followup with me in Cardiology clinic within the next 1 year.    Please return to clinic sooner or seek emergent care if your symptoms reoccur or worsen.    Thank you for allowing me to participate in their care.  Please feel free to call me with any further questions or concerns.        Evans Castano MD, FACC, [facility]  Division of Cardiovascular Medicine  System Director, Nuclear Cardiology   Medical Director, [facility] [place], [facility]   Clinical , [facility] [employer] [facility] [employer]  [email or web address]   Office:  [phone]

## 2025-07-16 NOTE — PATIENT INSTRUCTIONS
Please continue diltiazem extended release 120 mg once daily in the evening and rosuvastatin 10 mg daily.    We will obtain a transthoracic echocardiogram for structural evaluation including ejection fraction, assessment of regional wall motion abnormalities or valvular disease, and further evaluation of hemodynamics prior to follow-up.    Please followup with me in Cardiology clinic within the next 1 year.    Please return to clinic sooner or seek emergent care if your symptoms reoccur or worsen.

## 2026-04-17 ENCOUNTER — APPOINTMENT (OUTPATIENT)
Dept: PRIMARY CARE | Facility: CLINIC | Age: 71
End: 2026-04-17
Payer: COMMERCIAL

## 2026-07-16 ENCOUNTER — APPOINTMENT (OUTPATIENT)
Dept: CARDIOLOGY | Facility: CLINIC | Age: 71
End: 2026-07-16
Payer: COMMERCIAL